# Patient Record
Sex: FEMALE | Race: BLACK OR AFRICAN AMERICAN | NOT HISPANIC OR LATINO | Employment: UNEMPLOYED | URBAN - METROPOLITAN AREA
[De-identification: names, ages, dates, MRNs, and addresses within clinical notes are randomized per-mention and may not be internally consistent; named-entity substitution may affect disease eponyms.]

---

## 2023-06-25 ENCOUNTER — HOSPITAL ENCOUNTER (INPATIENT)
Facility: HOSPITAL | Age: 73
LOS: 1 days | Discharge: HOME | DRG: 378 | End: 2023-06-29
Attending: EMERGENCY MEDICINE | Admitting: HOSPITALIST
Payer: MEDICARE

## 2023-06-25 DIAGNOSIS — N12 PYELONEPHRITIS: ICD-10-CM

## 2023-06-25 DIAGNOSIS — K92.2 ACUTE UPPER GI BLEED: ICD-10-CM

## 2023-06-25 DIAGNOSIS — D64.9 ANEMIA, UNSPECIFIED TYPE: Primary | ICD-10-CM

## 2023-06-25 DIAGNOSIS — R07.9 CHEST PAIN: ICD-10-CM

## 2023-06-25 DIAGNOSIS — K92.1 MELENA: ICD-10-CM

## 2023-06-25 PROBLEM — F32.A DEPRESSION: Status: ACTIVE | Noted: 2023-06-25

## 2023-06-25 PROBLEM — E11.9 DM (DIABETES MELLITUS): Status: ACTIVE | Noted: 2023-06-25

## 2023-06-25 PROBLEM — E03.9 HYPOTHYROIDISM, UNSPECIFIED: Status: ACTIVE | Noted: 2023-06-25

## 2023-06-25 PROBLEM — I10 HYPERTENSION: Status: ACTIVE | Noted: 2023-06-25

## 2023-06-25 PROBLEM — E78.5 HYPERLIPIDEMIA: Status: ACTIVE | Noted: 2023-06-25

## 2023-06-25 LAB
ALBUMIN SERPL BCP-MCNC: 3.6 G/DL (ref 3.5–5.2)
ALP SERPL-CCNC: 86 U/L (ref 55–135)
ALT SERPL W/O P-5'-P-CCNC: 17 U/L (ref 10–44)
ANION GAP SERPL CALC-SCNC: 13 MMOL/L (ref 8–16)
AST SERPL-CCNC: 26 U/L (ref 10–40)
BACTERIA #/AREA URNS HPF: ABNORMAL /HPF
BASOPHILS # BLD AUTO: 0.05 K/UL (ref 0–0.2)
BASOPHILS NFR BLD: 0.5 % (ref 0–1.9)
BILIRUB SERPL-MCNC: 0.6 MG/DL (ref 0.1–1)
BILIRUB UR QL STRIP: NEGATIVE
BUN SERPL-MCNC: 23 MG/DL (ref 8–23)
CALCIUM SERPL-MCNC: 9.1 MG/DL (ref 8.7–10.5)
CHLORIDE SERPL-SCNC: 105 MMOL/L (ref 95–110)
CLARITY UR: ABNORMAL
CO2 SERPL-SCNC: 18 MMOL/L (ref 23–29)
COLOR UR: YELLOW
CREAT SERPL-MCNC: 1.4 MG/DL (ref 0.5–1.4)
DIFFERENTIAL METHOD: ABNORMAL
EOSINOPHIL # BLD AUTO: 0.1 K/UL (ref 0–0.5)
EOSINOPHIL NFR BLD: 1.1 % (ref 0–8)
ERYTHROCYTE [DISTWIDTH] IN BLOOD BY AUTOMATED COUNT: 17.8 % (ref 11.5–14.5)
EST. GFR  (NO RACE VARIABLE): 40 ML/MIN/1.73 M^2
GLUCOSE SERPL-MCNC: 195 MG/DL (ref 70–110)
GLUCOSE UR QL STRIP: ABNORMAL
HCT VFR BLD AUTO: 29.2 % (ref 37–48.5)
HGB BLD-MCNC: 8.5 G/DL (ref 12–16)
HGB UR QL STRIP: ABNORMAL
HYALINE CASTS #/AREA URNS LPF: 0 /LPF
IMM GRANULOCYTES # BLD AUTO: 0.03 K/UL (ref 0–0.04)
IMM GRANULOCYTES NFR BLD AUTO: 0.3 % (ref 0–0.5)
KETONES UR QL STRIP: NEGATIVE
LEUKOCYTE ESTERASE UR QL STRIP: ABNORMAL
LIPASE SERPL-CCNC: 43 U/L (ref 4–60)
LYMPHOCYTES # BLD AUTO: 2.1 K/UL (ref 1–4.8)
LYMPHOCYTES NFR BLD: 21.3 % (ref 18–48)
MCH RBC QN AUTO: 22.5 PG (ref 27–31)
MCHC RBC AUTO-ENTMCNC: 29.1 G/DL (ref 32–36)
MCV RBC AUTO: 78 FL (ref 82–98)
MICROSCOPIC COMMENT: ABNORMAL
MONOCYTES # BLD AUTO: 0.9 K/UL (ref 0.3–1)
MONOCYTES NFR BLD: 9.4 % (ref 4–15)
NEUTROPHILS # BLD AUTO: 6.5 K/UL (ref 1.8–7.7)
NEUTROPHILS NFR BLD: 67.4 % (ref 38–73)
NITRITE UR QL STRIP: POSITIVE
NRBC BLD-RTO: 1 /100 WBC
PH UR STRIP: 6 [PH] (ref 5–8)
PLATELET # BLD AUTO: 415 K/UL (ref 150–450)
PMV BLD AUTO: 8.5 FL (ref 9.2–12.9)
POTASSIUM SERPL-SCNC: 3.8 MMOL/L (ref 3.5–5.1)
PROT SERPL-MCNC: 8 G/DL (ref 6–8.4)
PROT UR QL STRIP: ABNORMAL
RBC # BLD AUTO: 3.77 M/UL (ref 4–5.4)
RBC #/AREA URNS HPF: 9 /HPF (ref 0–4)
SODIUM SERPL-SCNC: 136 MMOL/L (ref 136–145)
SP GR UR STRIP: 1.02 (ref 1–1.03)
SQUAMOUS #/AREA URNS HPF: 2 /HPF
URN SPEC COLLECT METH UR: ABNORMAL
UROBILINOGEN UR STRIP-ACNC: NEGATIVE EU/DL
WBC # BLD AUTO: 9.64 K/UL (ref 3.9–12.7)
WBC #/AREA URNS HPF: >100 /HPF (ref 0–5)
WBC CLUMPS URNS QL MICRO: ABNORMAL
YEAST URNS QL MICRO: ABNORMAL

## 2023-06-25 PROCEDURE — 87088 URINE BACTERIA CULTURE: CPT | Performed by: PHYSICIAN ASSISTANT

## 2023-06-25 PROCEDURE — C9113 INJ PANTOPRAZOLE SODIUM, VIA: HCPCS | Performed by: EMERGENCY MEDICINE

## 2023-06-25 PROCEDURE — 99285 EMERGENCY DEPT VISIT HI MDM: CPT | Mod: 25

## 2023-06-25 PROCEDURE — 96365 THER/PROPH/DIAG IV INF INIT: CPT

## 2023-06-25 PROCEDURE — G0378 HOSPITAL OBSERVATION PER HR: HCPCS

## 2023-06-25 PROCEDURE — 63600175 PHARM REV CODE 636 W HCPCS: Performed by: EMERGENCY MEDICINE

## 2023-06-25 PROCEDURE — 87040 BLOOD CULTURE FOR BACTERIA: CPT | Performed by: NURSE PRACTITIONER

## 2023-06-25 PROCEDURE — 96375 TX/PRO/DX INJ NEW DRUG ADDON: CPT

## 2023-06-25 PROCEDURE — 80053 COMPREHEN METABOLIC PANEL: CPT | Performed by: PHYSICIAN ASSISTANT

## 2023-06-25 PROCEDURE — 81000 URINALYSIS NONAUTO W/SCOPE: CPT | Performed by: PHYSICIAN ASSISTANT

## 2023-06-25 PROCEDURE — 87086 URINE CULTURE/COLONY COUNT: CPT | Performed by: PHYSICIAN ASSISTANT

## 2023-06-25 PROCEDURE — 83690 ASSAY OF LIPASE: CPT | Performed by: PHYSICIAN ASSISTANT

## 2023-06-25 PROCEDURE — 85025 COMPLETE CBC W/AUTO DIFF WBC: CPT | Performed by: PHYSICIAN ASSISTANT

## 2023-06-25 PROCEDURE — 25000003 PHARM REV CODE 250: Performed by: EMERGENCY MEDICINE

## 2023-06-25 PROCEDURE — 87077 CULTURE AEROBIC IDENTIFY: CPT | Performed by: PHYSICIAN ASSISTANT

## 2023-06-25 PROCEDURE — 87186 SC STD MICRODIL/AGAR DIL: CPT | Performed by: PHYSICIAN ASSISTANT

## 2023-06-25 RX ORDER — GABAPENTIN 300 MG/1
300 CAPSULE ORAL DAILY
Status: DISCONTINUED | OUTPATIENT
Start: 2023-06-26 | End: 2023-06-29 | Stop reason: HOSPADM

## 2023-06-25 RX ORDER — SULFAMETHOXAZOLE AND TRIMETHOPRIM 800; 160 MG/1; MG/1
1 TABLET ORAL 2 TIMES DAILY
Status: ON HOLD | COMMUNITY
Start: 2023-05-05 | End: 2023-06-27 | Stop reason: HOSPADM

## 2023-06-25 RX ORDER — TALC
6 POWDER (GRAM) TOPICAL NIGHTLY PRN
Status: DISCONTINUED | OUTPATIENT
Start: 2023-06-25 | End: 2023-06-29 | Stop reason: HOSPADM

## 2023-06-25 RX ORDER — GLUCAGON 1 MG
1 KIT INJECTION
Status: DISCONTINUED | OUTPATIENT
Start: 2023-06-26 | End: 2023-06-29 | Stop reason: HOSPADM

## 2023-06-25 RX ORDER — LEVOTHYROXINE SODIUM 112 UG/1
112 TABLET ORAL
COMMUNITY
Start: 2023-03-14

## 2023-06-25 RX ORDER — GABAPENTIN 300 MG/1
CAPSULE ORAL
COMMUNITY
Start: 2023-06-03

## 2023-06-25 RX ORDER — LEVOTHYROXINE SODIUM 112 UG/1
112 TABLET ORAL
Status: DISCONTINUED | OUTPATIENT
Start: 2023-06-26 | End: 2023-06-29 | Stop reason: HOSPADM

## 2023-06-25 RX ORDER — EMPAGLIFLOZIN 25 MG/1
25 TABLET, FILM COATED ORAL
COMMUNITY
Start: 2023-06-02

## 2023-06-25 RX ORDER — ONDANSETRON 2 MG/ML
4 INJECTION INTRAMUSCULAR; INTRAVENOUS EVERY 8 HOURS PRN
Status: DISCONTINUED | OUTPATIENT
Start: 2023-06-25 | End: 2023-06-29 | Stop reason: HOSPADM

## 2023-06-25 RX ORDER — DEXTROSE 40 %
15 GEL (GRAM) ORAL
Status: DISCONTINUED | OUTPATIENT
Start: 2023-06-25 | End: 2023-06-29 | Stop reason: HOSPADM

## 2023-06-25 RX ORDER — LIRAGLUTIDE 6 MG/ML
INJECTION SUBCUTANEOUS
COMMUNITY
Start: 2023-05-31

## 2023-06-25 RX ORDER — GLUCAGON 1 MG
1 KIT INJECTION
Status: CANCELLED | OUTPATIENT
Start: 2023-06-25

## 2023-06-25 RX ORDER — DULOXETIN HYDROCHLORIDE 30 MG/1
30 CAPSULE, DELAYED RELEASE ORAL 2 TIMES DAILY
COMMUNITY
Start: 2023-06-02

## 2023-06-25 RX ORDER — MORPHINE SULFATE 2 MG/ML
2 INJECTION, SOLUTION INTRAMUSCULAR; INTRAVENOUS
Status: COMPLETED | OUTPATIENT
Start: 2023-06-25 | End: 2023-06-25

## 2023-06-25 RX ORDER — DULOXETIN HYDROCHLORIDE 30 MG/1
30 CAPSULE, DELAYED RELEASE ORAL 2 TIMES DAILY
Status: DISCONTINUED | OUTPATIENT
Start: 2023-06-26 | End: 2023-06-29 | Stop reason: HOSPADM

## 2023-06-25 RX ORDER — AMLODIPINE BESYLATE 5 MG/1
5 TABLET ORAL
COMMUNITY
Start: 2023-06-02

## 2023-06-25 RX ORDER — OMEPRAZOLE 20 MG/1
20 CAPSULE, DELAYED RELEASE ORAL 2 TIMES DAILY
Status: ON HOLD | COMMUNITY
Start: 2023-04-25 | End: 2023-06-27 | Stop reason: HOSPADM

## 2023-06-25 RX ORDER — FERROUS SULFATE 325(65) MG
325 TABLET ORAL 2 TIMES DAILY
COMMUNITY

## 2023-06-25 RX ORDER — KETOROLAC TROMETHAMINE 30 MG/ML
15 INJECTION, SOLUTION INTRAMUSCULAR; INTRAVENOUS
Status: COMPLETED | OUTPATIENT
Start: 2023-06-25 | End: 2023-06-25

## 2023-06-25 RX ORDER — GLUCAGON 1 MG
1 KIT INJECTION
Status: DISCONTINUED | OUTPATIENT
Start: 2023-06-25 | End: 2023-06-29 | Stop reason: HOSPADM

## 2023-06-25 RX ORDER — DEXTROSE 40 %
30 GEL (GRAM) ORAL
Status: DISCONTINUED | OUTPATIENT
Start: 2023-06-25 | End: 2023-06-29 | Stop reason: HOSPADM

## 2023-06-25 RX ORDER — KETOROLAC TROMETHAMINE 30 MG/ML
15 INJECTION, SOLUTION INTRAMUSCULAR; INTRAVENOUS EVERY 6 HOURS PRN
Status: DISCONTINUED | OUTPATIENT
Start: 2023-06-26 | End: 2023-06-26

## 2023-06-25 RX ORDER — METFORMIN HYDROCHLORIDE 500 MG/1
500 TABLET ORAL 2 TIMES DAILY
COMMUNITY
Start: 2023-01-04

## 2023-06-25 RX ORDER — SODIUM CHLORIDE 0.9 % (FLUSH) 0.9 %
3 SYRINGE (ML) INJECTION EVERY 12 HOURS PRN
Status: DISCONTINUED | OUTPATIENT
Start: 2023-06-25 | End: 2023-06-29 | Stop reason: HOSPADM

## 2023-06-25 RX ORDER — INSULIN ASPART 100 [IU]/ML
1-10 INJECTION, SOLUTION INTRAVENOUS; SUBCUTANEOUS EVERY 6 HOURS PRN
Status: DISCONTINUED | OUTPATIENT
Start: 2023-06-26 | End: 2023-06-29 | Stop reason: HOSPADM

## 2023-06-25 RX ORDER — ALBUTEROL SULFATE 90 UG/1
AEROSOL, METERED RESPIRATORY (INHALATION)
COMMUNITY
Start: 2023-06-02

## 2023-06-25 RX ORDER — CARVEDILOL 3.12 MG/1
3.12 TABLET ORAL 2 TIMES DAILY
Status: DISCONTINUED | OUTPATIENT
Start: 2023-06-26 | End: 2023-06-26

## 2023-06-25 RX ORDER — PANTOPRAZOLE SODIUM 40 MG/10ML
80 INJECTION, POWDER, LYOPHILIZED, FOR SOLUTION INTRAVENOUS ONCE
Status: COMPLETED | OUTPATIENT
Start: 2023-06-25 | End: 2023-06-25

## 2023-06-25 RX ORDER — LOSARTAN POTASSIUM 50 MG/1
50 TABLET ORAL
COMMUNITY
Start: 2023-02-14

## 2023-06-25 RX ORDER — CARVEDILOL 3.12 MG/1
3.12 TABLET ORAL 2 TIMES DAILY
COMMUNITY
Start: 2023-04-25

## 2023-06-25 RX ORDER — APIXABAN 5 MG/1
5 TABLET, FILM COATED ORAL 2 TIMES DAILY
Status: ON HOLD | COMMUNITY
Start: 2023-06-02 | End: 2023-06-27 | Stop reason: SDUPTHER

## 2023-06-25 RX ORDER — LANOLIN ALCOHOL/MO/W.PET/CERES
1 CREAM (GRAM) TOPICAL 2 TIMES DAILY
Status: DISCONTINUED | OUTPATIENT
Start: 2023-06-26 | End: 2023-06-29 | Stop reason: HOSPADM

## 2023-06-25 RX ORDER — BUDESONIDE AND FORMOTEROL FUMARATE DIHYDRATE 160; 4.5 UG/1; UG/1
AEROSOL RESPIRATORY (INHALATION)
COMMUNITY
Start: 2023-06-02

## 2023-06-25 RX ORDER — NALOXONE HCL 0.4 MG/ML
0.02 VIAL (ML) INJECTION
Status: DISCONTINUED | OUTPATIENT
Start: 2023-06-25 | End: 2023-06-29 | Stop reason: HOSPADM

## 2023-06-25 RX ORDER — ATORVASTATIN CALCIUM 40 MG/1
40 TABLET, FILM COATED ORAL
COMMUNITY
Start: 2023-06-02

## 2023-06-25 RX ORDER — MORPHINE SULFATE 4 MG/ML
4 INJECTION, SOLUTION INTRAMUSCULAR; INTRAVENOUS EVERY 6 HOURS PRN
Status: DISCONTINUED | OUTPATIENT
Start: 2023-06-26 | End: 2023-06-26

## 2023-06-25 RX ORDER — PANTOPRAZOLE SODIUM 40 MG/10ML
40 INJECTION, POWDER, LYOPHILIZED, FOR SOLUTION INTRAVENOUS 2 TIMES DAILY
Status: DISCONTINUED | OUTPATIENT
Start: 2023-06-26 | End: 2023-06-27

## 2023-06-25 RX ORDER — ONDANSETRON 2 MG/ML
4 INJECTION INTRAMUSCULAR; INTRAVENOUS
Status: COMPLETED | OUTPATIENT
Start: 2023-06-25 | End: 2023-06-25

## 2023-06-25 RX ORDER — ATORVASTATIN CALCIUM 40 MG/1
40 TABLET, FILM COATED ORAL DAILY
Status: DISCONTINUED | OUTPATIENT
Start: 2023-06-26 | End: 2023-06-29 | Stop reason: HOSPADM

## 2023-06-25 RX ADMIN — CEFTRIAXONE 1 G: 1 INJECTION, POWDER, FOR SOLUTION INTRAMUSCULAR; INTRAVENOUS at 07:06

## 2023-06-25 RX ADMIN — ONDANSETRON 4 MG: 2 INJECTION INTRAMUSCULAR; INTRAVENOUS at 08:06

## 2023-06-25 RX ADMIN — PANTOPRAZOLE SODIUM 80 MG: 40 INJECTION, POWDER, LYOPHILIZED, FOR SOLUTION INTRAVENOUS at 09:06

## 2023-06-25 RX ADMIN — MORPHINE SULFATE 2 MG: 2 INJECTION, SOLUTION INTRAMUSCULAR; INTRAVENOUS at 08:06

## 2023-06-25 RX ADMIN — KETOROLAC TROMETHAMINE 15 MG: 30 INJECTION, SOLUTION INTRAMUSCULAR; INTRAVENOUS at 07:06

## 2023-06-25 NOTE — ED NOTES
Pt presents to ED for left side pain. Pt from [place], missed flight.    Review of patient's allergies indicates:  No Known Allergies    APPEARANCE: Alert, oriented x 4, and in no acute distress.  NEURO: 5/5 strength major flexors/extensors bilaterally. Sensory intact to light touch bilaterally. Sandy coma scale: eyes open spontaneously-4, oriented & converses-5, obeys commands-6. No neurological abnormalities. Denies HA, dizziness, photophobia.  CARDIAC: Normal rate and rhythm through apical/radial pulse, S1 and S2 noted, denies CP.   RESPIRATORY: +tachypneic, expiratory wheezing, hx of COPD and asthma; No SOB reported.  PERIPHERAL VASCULAR: +2 peripheral pulses present. Normal cap refill <3sec. No edema. Warm to touch.   GASTRO: Abdomen soft, flat, bowel sounds normal and active in all 4 quadrants, no tenderness, no abdominal distention. Denies NVD.  MUSC: +left side pain, Full ROM. No bony tenderness or soft tissue tenderness. No obvious deformity.  SKIN: Skin is warm and dry, normal skin turgor, mucous membranes moist.   GENITOURINARY: Voids spontaneously, denies itching, burning, hematuria.    Patient changed into hospital gown with assistance. Side rails x 2, bed low and locked position, call light in reach and instructed how to use. Pt educated on fall risk and verbalized understanding. Cardiac monitor, pulse ox, and automatic BP cuff applied; alarms set and audible.

## 2023-06-25 NOTE — FIRST PROVIDER EVALUATION
Emergency Department TeleTriage Encounter Note      CHIEF COMPLAINT    Chief Complaint   Patient presents with    Abdominal Pain     LUQ, abd pain that started yesterday; denies n/v and diarrhea. LBM yesterday, and stool was dark. HX of  DM, HTN, COPD, Hyperthyroidism, hyperlipidemia, bronchitis, and anemia. No other symptoms at this time.  7/10 pain.        VITAL SIGNS   Initial Vitals [06/25/23 1702]   BP Pulse Resp Temp SpO2   133/62 107 20 98.7 °F (37.1 °C) 98 %      MAP       --            ALLERGIES    Review of patient's allergies indicates:  No Known Allergies    PROVIDER TRIAGE NOTE  Patient presents with LUQ pain and dark stool for 2 days. No N/V/D. Reports pain worse with coughing. Has only been coughing occasionally.       ORDERS  Labs Reviewed - No data to display    ED Orders (720h ago, onward)      None              Virtual Visit Note: The provider triage portion of this emergency department evaluation and documentation was performed via [employer], a HIPAA-compliant telemedicine application, in concert with a tele-presenter in the room. A face to face patient evaluation with one of my colleagues will occur once the patient is placed in an emergency department room.      DISCLAIMER: This note was prepared with [employer] voice recognition transcription software. Garbled syntax, mangled pronouns, and other bizarre constructions may be attributed to that software system.

## 2023-06-25 NOTE — Clinical Note
Diagnosis: Pyelonephritis [828802]   Future Attending Provider: DALI DESIR [[id number]]   Admitting Provider:: DALI DESIR [[id number]]   Special Needs:: No Special Needs [1]

## 2023-06-26 ENCOUNTER — ANESTHESIA (OUTPATIENT)
Dept: ENDOSCOPY | Facility: HOSPITAL | Age: 73
DRG: 378 | End: 2023-06-26
Payer: MEDICARE

## 2023-06-26 ENCOUNTER — ANESTHESIA EVENT (OUTPATIENT)
Dept: ENDOSCOPY | Facility: HOSPITAL | Age: 73
DRG: 378 | End: 2023-06-26
Payer: MEDICARE

## 2023-06-26 LAB
ANION GAP SERPL CALC-SCNC: 12 MMOL/L (ref 8–16)
BASOPHILS # BLD AUTO: 0.04 K/UL (ref 0–0.2)
BASOPHILS NFR BLD: 0.4 % (ref 0–1.9)
BUN SERPL-MCNC: 26 MG/DL (ref 8–23)
CALCIUM SERPL-MCNC: 9 MG/DL (ref 8.7–10.5)
CHLORIDE SERPL-SCNC: 106 MMOL/L (ref 95–110)
CO2 SERPL-SCNC: 20 MMOL/L (ref 23–29)
CREAT SERPL-MCNC: 1.5 MG/DL (ref 0.5–1.4)
DIFFERENTIAL METHOD: ABNORMAL
EOSINOPHIL # BLD AUTO: 0.1 K/UL (ref 0–0.5)
EOSINOPHIL NFR BLD: 1.4 % (ref 0–8)
ERYTHROCYTE [DISTWIDTH] IN BLOOD BY AUTOMATED COUNT: 17.6 % (ref 11.5–14.5)
EST. GFR  (NO RACE VARIABLE): 37 ML/MIN/1.73 M^2
GLUCOSE SERPL-MCNC: 131 MG/DL (ref 70–110)
HCT VFR BLD AUTO: 27.7 % (ref 37–48.5)
HGB BLD-MCNC: 8.2 G/DL (ref 12–16)
IMM GRANULOCYTES # BLD AUTO: 0.03 K/UL (ref 0–0.04)
IMM GRANULOCYTES NFR BLD AUTO: 0.3 % (ref 0–0.5)
LYMPHOCYTES # BLD AUTO: 2.9 K/UL (ref 1–4.8)
LYMPHOCYTES NFR BLD: 31.3 % (ref 18–48)
MCH RBC QN AUTO: 22.8 PG (ref 27–31)
MCHC RBC AUTO-ENTMCNC: 29.6 G/DL (ref 32–36)
MCV RBC AUTO: 77 FL (ref 82–98)
MONOCYTES # BLD AUTO: 0.8 K/UL (ref 0.3–1)
MONOCYTES NFR BLD: 9.1 % (ref 4–15)
NEUTROPHILS # BLD AUTO: 5.3 K/UL (ref 1.8–7.7)
NEUTROPHILS NFR BLD: 57.5 % (ref 38–73)
NRBC BLD-RTO: 1 /100 WBC
PLATELET # BLD AUTO: 377 K/UL (ref 150–450)
PMV BLD AUTO: 8.5 FL (ref 9.2–12.9)
POCT GLUCOSE: 125 MG/DL (ref 70–110)
POCT GLUCOSE: 145 MG/DL (ref 70–110)
POCT GLUCOSE: 169 MG/DL (ref 70–110)
POTASSIUM SERPL-SCNC: 4 MMOL/L (ref 3.5–5.1)
RBC # BLD AUTO: 3.59 M/UL (ref 4–5.4)
SODIUM SERPL-SCNC: 138 MMOL/L (ref 136–145)
WBC # BLD AUTO: 9.19 K/UL (ref 3.9–12.7)

## 2023-06-26 PROCEDURE — 25500020 PHARM REV CODE 255: Performed by: STUDENT IN AN ORGANIZED HEALTH CARE EDUCATION/TRAINING PROGRAM

## 2023-06-26 PROCEDURE — 80048 BASIC METABOLIC PNL TOTAL CA: CPT | Performed by: NURSE PRACTITIONER

## 2023-06-26 PROCEDURE — D9220A PRA ANESTHESIA: ICD-10-PCS | Mod: ANES,,, | Performed by: ANESTHESIOLOGY

## 2023-06-26 PROCEDURE — G0378 HOSPITAL OBSERVATION PER HR: HCPCS

## 2023-06-26 PROCEDURE — 25000003 PHARM REV CODE 250: Performed by: NURSE ANESTHETIST, CERTIFIED REGISTERED

## 2023-06-26 PROCEDURE — 99205 PR OFFICE/OUTPT VISIT, NEW, LEVL V, 60-74 MIN: ICD-10-PCS | Mod: 25,,, | Performed by: INTERNAL MEDICINE

## 2023-06-26 PROCEDURE — 37000008 HC ANESTHESIA 1ST 15 MINUTES: Performed by: INTERNAL MEDICINE

## 2023-06-26 PROCEDURE — 96372 THER/PROPH/DIAG INJ SC/IM: CPT | Mod: 59 | Performed by: NURSE PRACTITIONER

## 2023-06-26 PROCEDURE — 85025 COMPLETE CBC W/AUTO DIFF WBC: CPT | Performed by: NURSE PRACTITIONER

## 2023-06-26 PROCEDURE — 99900035 HC TECH TIME PER 15 MIN (STAT)

## 2023-06-26 PROCEDURE — 94761 N-INVAS EAR/PLS OXIMETRY MLT: CPT

## 2023-06-26 PROCEDURE — 94640 AIRWAY INHALATION TREATMENT: CPT

## 2023-06-26 PROCEDURE — 63600175 PHARM REV CODE 636 W HCPCS: Performed by: NURSE ANESTHETIST, CERTIFIED REGISTERED

## 2023-06-26 PROCEDURE — 43255 EGD CONTROL BLEEDING ANY: CPT | Mod: ,,, | Performed by: INTERNAL MEDICINE

## 2023-06-26 PROCEDURE — C9113 INJ PANTOPRAZOLE SODIUM, VIA: HCPCS | Performed by: EMERGENCY MEDICINE

## 2023-06-26 PROCEDURE — 36415 COLL VENOUS BLD VENIPUNCTURE: CPT | Performed by: NURSE PRACTITIONER

## 2023-06-26 PROCEDURE — 43255 PR EGD, FLEX, W/CTRL BLEED, ANY METHOD: ICD-10-PCS | Mod: ,,, | Performed by: INTERNAL MEDICINE

## 2023-06-26 PROCEDURE — 25000003 PHARM REV CODE 250: Performed by: NURSE PRACTITIONER

## 2023-06-26 PROCEDURE — D9220A PRA ANESTHESIA: Mod: ANES,,, | Performed by: ANESTHESIOLOGY

## 2023-06-26 PROCEDURE — D9220A PRA ANESTHESIA: Mod: CRNA,,, | Performed by: NURSE ANESTHETIST, CERTIFIED REGISTERED

## 2023-06-26 PROCEDURE — 63600175 PHARM REV CODE 636 W HCPCS: Performed by: EMERGENCY MEDICINE

## 2023-06-26 PROCEDURE — 25000242 PHARM REV CODE 250 ALT 637 W/ HCPCS: Performed by: NURSE PRACTITIONER

## 2023-06-26 PROCEDURE — 63600175 PHARM REV CODE 636 W HCPCS: Performed by: NURSE PRACTITIONER

## 2023-06-26 PROCEDURE — 27202087 HC PROBE, APC: Performed by: INTERNAL MEDICINE

## 2023-06-26 PROCEDURE — D9220A PRA ANESTHESIA: ICD-10-PCS | Mod: CRNA,,, | Performed by: NURSE ANESTHETIST, CERTIFIED REGISTERED

## 2023-06-26 PROCEDURE — 43255 EGD CONTROL BLEEDING ANY: CPT | Performed by: INTERNAL MEDICINE

## 2023-06-26 PROCEDURE — 37000009 HC ANESTHESIA EA ADD 15 MINS: Performed by: INTERNAL MEDICINE

## 2023-06-26 PROCEDURE — 99205 OFFICE O/P NEW HI 60 MIN: CPT | Mod: 25,,, | Performed by: INTERNAL MEDICINE

## 2023-06-26 RX ORDER — ALBUTEROL SULFATE 90 UG/1
2 AEROSOL, METERED RESPIRATORY (INHALATION) EVERY 6 HOURS PRN
Status: DISCONTINUED | OUTPATIENT
Start: 2023-06-26 | End: 2023-06-29 | Stop reason: HOSPADM

## 2023-06-26 RX ORDER — LIDOCAINE HYDROCHLORIDE 20 MG/ML
INJECTION INTRAVENOUS
Status: DISCONTINUED | OUTPATIENT
Start: 2023-06-26 | End: 2023-06-26

## 2023-06-26 RX ORDER — SODIUM CHLORIDE 9 MG/ML
INJECTION, SOLUTION INTRAVENOUS CONTINUOUS
Status: DISCONTINUED | OUTPATIENT
Start: 2023-06-26 | End: 2023-06-28

## 2023-06-26 RX ORDER — PROPOFOL 10 MG/ML
VIAL (ML) INTRAVENOUS
Status: DISCONTINUED | OUTPATIENT
Start: 2023-06-26 | End: 2023-06-26

## 2023-06-26 RX ORDER — FLUTICASONE FUROATE AND VILANTEROL 100; 25 UG/1; UG/1
1 POWDER RESPIRATORY (INHALATION) DAILY
Status: DISCONTINUED | OUTPATIENT
Start: 2023-06-26 | End: 2023-06-29 | Stop reason: HOSPADM

## 2023-06-26 RX ORDER — PROPOFOL 10 MG/ML
VIAL (ML) INTRAVENOUS CONTINUOUS PRN
Status: DISCONTINUED | OUTPATIENT
Start: 2023-06-26 | End: 2023-06-26

## 2023-06-26 RX ORDER — POLYETHYLENE GLYCOL 3350 17 G/17G
17 POWDER, FOR SOLUTION ORAL DAILY
Status: DISCONTINUED | OUTPATIENT
Start: 2023-06-26 | End: 2023-06-29 | Stop reason: HOSPADM

## 2023-06-26 RX ORDER — HYDRALAZINE HYDROCHLORIDE 20 MG/ML
10 INJECTION INTRAMUSCULAR; INTRAVENOUS EVERY 8 HOURS PRN
Status: DISCONTINUED | OUTPATIENT
Start: 2023-06-26 | End: 2023-06-29 | Stop reason: HOSPADM

## 2023-06-26 RX ORDER — HYDROCODONE BITARTRATE AND ACETAMINOPHEN 10; 325 MG/1; MG/1
1 TABLET ORAL EVERY 6 HOURS PRN
Status: DISCONTINUED | OUTPATIENT
Start: 2023-06-26 | End: 2023-06-29 | Stop reason: HOSPADM

## 2023-06-26 RX ORDER — HYDROCODONE BITARTRATE AND ACETAMINOPHEN 5; 325 MG/1; MG/1
1 TABLET ORAL EVERY 6 HOURS PRN
Status: DISCONTINUED | OUTPATIENT
Start: 2023-06-26 | End: 2023-06-29 | Stop reason: HOSPADM

## 2023-06-26 RX ADMIN — SODIUM CHLORIDE: 9 INJECTION, SOLUTION INTRAVENOUS at 05:06

## 2023-06-26 RX ADMIN — ATORVASTATIN CALCIUM 40 MG: 40 TABLET, FILM COATED ORAL at 09:06

## 2023-06-26 RX ADMIN — LIDOCAINE HYDROCHLORIDE 100 MG: 20 INJECTION, SOLUTION INTRAVENOUS at 03:06

## 2023-06-26 RX ADMIN — DULOXETINE 30 MG: 30 CAPSULE, DELAYED RELEASE ORAL at 09:06

## 2023-06-26 RX ADMIN — MORPHINE SULFATE 4 MG: 4 INJECTION INTRAVENOUS at 06:06

## 2023-06-26 RX ADMIN — FERROUS SULFATE TAB 325 MG (65 MG ELEMENTAL FE) 1 EACH: 325 (65 FE) TAB at 08:06

## 2023-06-26 RX ADMIN — SODIUM CHLORIDE: 0.9 INJECTION, SOLUTION INTRAVENOUS at 03:06

## 2023-06-26 RX ADMIN — FLUTICASONE FUROATE AND VILANTEROL TRIFENATATE 1 PUFF: 100; 25 POWDER RESPIRATORY (INHALATION) at 11:06

## 2023-06-26 RX ADMIN — HYDROCODONE BITARTRATE AND ACETAMINOPHEN 1 TABLET: 10; 325 TABLET ORAL at 08:06

## 2023-06-26 RX ADMIN — INSULIN ASPART 2 UNITS: 100 INJECTION, SOLUTION INTRAVENOUS; SUBCUTANEOUS at 12:06

## 2023-06-26 RX ADMIN — SODIUM CHLORIDE: 9 INJECTION, SOLUTION INTRAVENOUS at 12:06

## 2023-06-26 RX ADMIN — HYDRALAZINE HYDROCHLORIDE 10 MG: 20 INJECTION, SOLUTION INTRAMUSCULAR; INTRAVENOUS at 08:06

## 2023-06-26 RX ADMIN — CEFTRIAXONE 1 G: 1 INJECTION, POWDER, FOR SOLUTION INTRAMUSCULAR; INTRAVENOUS at 06:06

## 2023-06-26 RX ADMIN — MORPHINE SULFATE 4 MG: 4 INJECTION INTRAVENOUS at 12:06

## 2023-06-26 RX ADMIN — LEVOTHYROXINE SODIUM 112 MCG: 112 TABLET ORAL at 06:06

## 2023-06-26 RX ADMIN — FERROUS SULFATE TAB 325 MG (65 MG ELEMENTAL FE) 1 EACH: 325 (65 FE) TAB at 09:06

## 2023-06-26 RX ADMIN — PANTOPRAZOLE SODIUM 40 MG: 40 INJECTION, POWDER, LYOPHILIZED, FOR SOLUTION INTRAVENOUS at 09:06

## 2023-06-26 RX ADMIN — GABAPENTIN 300 MG: 300 CAPSULE ORAL at 09:06

## 2023-06-26 RX ADMIN — PROPOFOL 100 MCG/KG/MIN: 10 INJECTION, EMULSION INTRAVENOUS at 03:06

## 2023-06-26 RX ADMIN — DULOXETINE 30 MG: 30 CAPSULE, DELAYED RELEASE ORAL at 08:06

## 2023-06-26 RX ADMIN — PANTOPRAZOLE SODIUM 40 MG: 40 INJECTION, POWDER, LYOPHILIZED, FOR SOLUTION INTRAVENOUS at 08:06

## 2023-06-26 RX ADMIN — PROPOFOL 70 MG: 10 INJECTION, EMULSION INTRAVENOUS at 03:06

## 2023-06-26 RX ADMIN — IOHEXOL 100 ML: 350 INJECTION, SOLUTION INTRAVENOUS at 06:06

## 2023-06-26 RX ADMIN — POLYETHYLENE GLYCOL 3350 17 G: 17 POWDER, FOR SOLUTION ORAL at 08:06

## 2023-06-26 NOTE — TRANSFER OF CARE
"Anesthesia Transfer of Care Note    Patient: Alex Lovell    Procedure(s) Performed: Procedure(s) (LRB):  EGD (ESOPHAGOGASTRODUODENOSCOPY) (N/A)    Patient location: GI    Anesthesia Type: general    Transport from OR: Transported from OR on room air with adequate spontaneous ventilation    Post pain: adequate analgesia    Post assessment: no apparent anesthetic complications    Post vital signs: stable    Level of consciousness: awake, alert and oriented    Nausea/Vomiting: no nausea/vomiting    Complications: none    Transfer of care protocol was followed      Last vitals:   Visit Vitals  /86 (BP Location: Left arm, Patient Position: Sitting)   Pulse 95   Temp 36.5 °C (97.7 °F) (Temporal)   Resp 19   Ht 5' 2" (1.575 m)   Wt 97.4 kg (214 lb 11.7 oz)   SpO2 98%   BMI 39.27 kg/m²     "

## 2023-06-26 NOTE — PLAN OF CARE
Report given to Tona asif. 1 angioectasia treated with APC. Pt complaining of LUQ pain 7.5/10. Nurse and MD notified. Xray ordered for when pt returns to room. VSS /77, RR 18, sats 99%, HR 90. Nurse states pt c/o LUQ pain prior to hospital arrival. Transport placed for pt to return to room.

## 2023-06-26 NOTE — ASSESSMENT & PLAN NOTE
Patient's FSGs are controlled on current medication regimen.  Last A1c reviewed- No results found for: LABA1C, HGBA1C  Most recent glucose reviewed- 195  Current correctional scale  Medium  Maintain anti-hyperglycemic dose as follows-   Antihyperglycemics (From admission, onward)    Start     Stop Route Frequency Ordered    06/26/23 0027  insulin aspart U-100 pen 1-10 Units         -- SubQ Every 6 hours PRN 06/25/23 8127        Hold Oral hypoglycemics while patient is in the hospital.

## 2023-06-26 NOTE — ANESTHESIA PREPROCEDURE EVALUATION
[facility]  Anesthesia Pre-Operative Evaluation         Patient Name: Alex Lovell  YOB: 1950  MRN: [id number]    SUBJECTIVE:     06/26/2023    Procedure(s) (LRB):  EGD (ESOPHAGOGASTRODUODENOSCOPY) (N/A)    Alex Lovell is a 72 y.o. female here for Procedure(s) (LRB):  EGD (ESOPHAGOGASTRODUODENOSCOPY) (N/A)    Drips:    sodium chloride 0.9% 75 mL/hr at 06/26/23 1230       Patient Active Problem List   Diagnosis    Pyelonephritis    Anemia    Melena    DM (diabetes mellitus)    Depression    Hypertension    Hyperlipidemia    Hypothyroidism, unspecified       Review of patient's allergies indicates:  No Known Allergies    No current facility-administered medications on file prior to encounter.     Current Outpatient Medications on File Prior to Encounter   Medication Sig Dispense Refill    amLODIPine (NORVASC) 5 MG tablet Take 5 mg by mouth.      atorvastatin (LIPITOR) 40 MG tablet Take 40 mg by mouth.      carvediloL (COREG) 3.125 MG tablet Take 3.125 mg by mouth 2 (two) times daily.      DULoxetine (CYMBALTA) 30 MG capsule Take 30 mg by mouth 2 (two) times daily.      ELIQUIS 5 mg Tab Take 5 mg by mouth 2 (two) times daily.      ferrous sulfate (FEOSOL) 325 mg (65 mg iron) Tab tablet Take 325 mg by mouth 2 (two) times daily.      gabapentin (NEURONTIN) 300 MG capsule Take by mouth.      JARDIANCE 25 mg tablet Take 25 mg by mouth.      levothyroxine (SYNTHROID) 112 MCG tablet Take 112 mcg by mouth.      losartan (COZAAR) 50 MG tablet Take 50 mg by mouth.      metFORMIN (GLUCOPHAGE) 500 MG tablet Take 500 mg by mouth 2 (two) times daily.      omeprazole (PRILOSEC) 20 MG capsule Take 20 mg by mouth 2 (two) times daily.      sulfamethoxazole-trimethoprim 800-160mg (BACTRIM DS) 800-160 mg Tab Take 1 tablet by mouth 2 (two) times daily.      SYMBICORT 160-4.5  mcg/actuation HFAA Inhale into the lungs.      VENTOLIN HFA 90 mcg/actuation inhaler Inhale into the lungs.      VICTOZA 3-ANTONIO 0.6 mg/0.1 mL (18 mg/3 mL) PnIj pen Inject into the skin.         Past Surgical History:   Procedure Laterality Date    RESECTION, LUNG Left     LLL wedge resection       Social History     Socioeconomic History    Marital status: Single   Tobacco Use    Smoking status: Never    Smokeless tobacco: Never   Substance and Sexual Activity    Alcohol use: Not Currently    Drug use: Never    Sexual activity: Not Currently         OBJECTIVE:     Vital Signs Range (Last 24H):  Temp:  [35.8 °C (96.4 °F)-37.1 °C (98.7 °F)] 36.5 °C (97.7 °F)  Pulse:  [] 95  Resp:  [16-22] 19  SpO2:  [96 %-100 %] 98 %  BP: (125-181)/() 127/86    Significant Labs:  Lab Results   Component Value Date    WBC 9.19 06/26/2023    HGB 8.2 (L) 06/26/2023    HCT 27.7 (L) 06/26/2023     06/26/2023    ALT 17 06/25/2023    AST 26 06/25/2023     06/26/2023    K 4.0 06/26/2023     06/26/2023    CREATININE 1.5 (H) 06/26/2023    BUN 26 (H) 06/26/2023    CO2 20 (L) 06/26/2023         Pre-op Assessment    I have reviewed the Patient Summary Reports.     I have reviewed the Nursing Notes. I have reviewed the NPO Status.   I have reviewed the Medications.     Review of Systems  Anesthesia Hx:  No problems with previous Anesthesia  History of prior surgery of interest to airway management or planning:  Denies Personal Hx of Anesthesia complications.   Hematology/Oncology:         -- Cancer in past history: Other (see Oncology comments) left surgery  Oncology Comments: Lung cancer s/p wedge resection      Cardiovascular:   Hypertension Denies MI.      Pulmonary:   COPD Shortness of breath Sleep Apnea    Renal/:   Denies Chronic Renal Disease.     Hepatic/GI:   Hiatal Hernia, GERD Denies Liver Disease.    Neurological:   Denies TIA. Denies CVA. Denies Seizures.    Endocrine:   Diabetes Hypothyroidism  BMI 39.3  Obesity / BMI > 30  Psych:   depression          Physical Exam  General: Well nourished, Cooperative, Alert and Oriented    Airway:  Mallampati: III   Mouth Opening: Normal  TM Distance: Normal      Dental:  Edentulous    Chest/Lungs:  Tachypnea        Anesthesia Plan  Type of Anesthesia, risks & benefits discussed:    Anesthesia Type: Gen Natural Airway  Intra-op Monitoring Plan: Standard ASA Monitors  Post Op Pain Control Plan: multimodal analgesia and IV/PO Opioids PRN  Induction:  IV  Informed Consent: Informed consent signed with the Patient and all parties understand the risks and agree with anesthesia plan.  All questions answered.   ASA Score: 3  Day of Surgery Review of History & Physical: H&P Update referred to the surgeon/provider.    Ready For Surgery From Anesthesia Perspective.     .

## 2023-06-26 NOTE — ED NOTES
RN first encounter with pt, assumed care from ZENA Palmer. Pt sitting up in stretcher. Pt reports abdominal pain, pt has UTI, pt updated on results. Pain mediation ordered, RN will administer. Pt is hypertensive, pt reports hx of htn, states she was able to take her medications this morning. Pt also reports pain with inspiration. NAD noted. REU. Bed in lowest locked position, call bell within reach, side rails up x 2.

## 2023-06-26 NOTE — HPI
This is 73 y/o lady hx DM, htn, lung cancer, apparent PUD in past here for bilateral flank pain.  Gi consulted for melena.  Here in town for family reunion. Developed black looking stool. No vomiting. No raditaing symptoms. Does have flank pain and dysuria as well. + fatigue as well. No alleviating factors. Generalized malaise.     Noted hgb lower than baseline.   She reports hx of PUD in past, maybe 5 years ago    Also had colonoscopy in past, she cant recall exactly when, but reports it was fairly unremarkable.

## 2023-06-26 NOTE — ASSESSMENT & PLAN NOTE
· Associated with b/l flank pain L>R, malodorous urine, and urgency  · UA positive for 3+ leukocytes, many bacteria, >100 WBC, and many WBC clumps  · Continue rocephin  · Urine cx in process  · Blood cx in process  · PRN analgesics for flank pain    6/26  - blood culture 2/2 ngtd  - urine culture pending  - continue ceftriaxone

## 2023-06-26 NOTE — [employer]ATION PATIENT INSTRUCTIONS
Discharge Summary/Instructions after an Endoscopic Procedure  Patient Name: Alex Lovell  Patient MRN: [id number]  Patient YOB: 1950 Monday, June 26, 2023  Jose Mckeon MD  Dear patient,  As a result of recent federal legislation (The Federal Cures Act), you may   receive lab or pathology results from your procedure in your [facility]   account before your physician is able to contact you. Your physician or   their representative will relay the results to you with their   recommendations at their soonest availability.  Thank you,  Your health is very important to us during the Covid Crisis. Following your   procedure today, you will receive a daily text for 2 weeks asking about   signs or symptoms of Covid 19.  Please respond to this text when you   receive it so we can follow up and keep you as safe as possible.   RESTRICTIONS:  During your procedure today, you received medications for sedation.  These   medications may affect your judgment, balance and coordination.  Therefore,   for 24 hours, you have the following restrictions:   - DO NOT drive a car, operate machinery, make legal/financial decisions,   sign important papers or drink alcohol.    ACTIVITY:  Today: no heavy lifting, straining or running due to procedural   sedation/anesthesia.  The following day: return to full activity including work.  DIET:  Eat and drink normally unless instructed otherwise.     TREATMENT FOR COMMON SIDE EFFECTS:  - Mild abdominal pain, nausea, belching, bloating or excessive gas:  rest,   eat lightly and use a heating pad.  - Sore Throat: treat with throat lozenges and/or gargle with warm salt   water.  - Because air was used during the procedure, expelling large amounts of air   from your rectum or belching is normal.  - If a bowel prep was taken, you may not have a bowel movement for 1-3 days.    This is normal.  SYMPTOMS TO WATCH FOR AND REPORT TO YOUR PHYSICIAN:  1. Abdominal pain or bloating, other than  gas cramps.  2. Chest pain.  3. Back pain.  4. Signs of infection such as: chills or fever occurring within 24 hours   after the procedure.  5. Rectal bleeding, which would show as bright red, maroon, or black stools.   (A tablespoon of blood from the rectum is not serious, especially if   hemorrhoids are present.)  6. Vomiting.  7. Weakness or dizziness.  GO DIRECTLY TO THE NEAREST EMERGENCY ROOM IF YOU HAVE ANY OF THE FOLLOWING:      Difficulty breathing              Chills and/or fever over 101 F   Persistent vomiting and/or vomiting blood   Severe abdominal pain   Severe chest pain   Black, tarry stools   Bleeding- more than one tablespoon   Any other symptom or condition that you feel may need urgent attention  Your doctor recommends these additional instructions:  If any biopsies were taken, your doctors clinic will contact you in 1 to 2   weeks with any results.  - [facility] patient to [facility] for [facility] care.   - Advance diet as tolerated.   - Continue present medications.   - protonix twice a day for 2 months. Would hold eliquis for another 3 days,   then ok to resume  - Repeat upper endoscopy in 2 months assess rest of stomach, would give   liquids for 24 hour prior. This can be done back home  For questions, problems or results please call your physician - Jose Mckeon MD.  EMERGENCY PHONE NUMBER: [phone],  LAB RESULTS: [phone]  IF A COMPLICATION OR EMERGENCY SITUATION ARISES AND YOU ARE UNABLE TO REACH   YOUR PHYSICIAN - GO DIRECTLY TO THE EMERGENCY ROOM.  Jose Mckeon MD  6/26/2023 3:45:05 PM  This report has been verified and signed electronically.  Dear patient,  As a result of recent federal legislation (The Federal Cures Act), you may   receive lab or pathology results from your procedure in your [facility]   account before your physician is able to contact you. Your physician or   their representative will relay the results to you with their   recommendations at their  soonest availability.  Thank you,  [employer]ATION

## 2023-06-26 NOTE — ASSESSMENT & PLAN NOTE
· Home BP meds o/h given possible GIB  · Will resume when cleared by GI    - 6/26   - we will resume home BP meds

## 2023-06-26 NOTE — ASSESSMENT & PLAN NOTE
· Associated with b/l flank pain L>R, malodorous urine, and urgency  · UA positive for 3+ leukocytes, many bacteria, >100 WBC, and many WBC clumps  · Continue rocephin  · Urine cx in process  · Blood cx in process  · PRN analgesics for flank pain

## 2023-06-26 NOTE — PROGRESS NOTES
[facility] Medicine  Progress Note    Patient Name: Alex Lovell  MRN: [id number]  Patient Class: OP- Observation   Admission Date: 6/25/2023  Length of Stay: 0 days  Attending Physician: Erendira Salmon MD  Primary Care Provider: Primary Doctor No        Subjective:     Principal Problem:Pyelonephritis        HPI:  Alex Lovell is a 72 y.o. female with PMH of DMT2, HTN, hypothyroidism, lung cancer, anemia, and HLD. She presented to the emergency department today with a complaint of bilateral flank pain. Left worse than right. She reports some pain with urination, foul odor of urine, and urgency.  She denies any CP, SOB, hematuria, abd pain, N/V/D, fevers, chills, or diaphoresis. Pain is a cramping pain, worse with movement. She reports that she flew into [place] from [place] for a family reunion and started feeling bad a couple of days ago. She states the pain just continued to get worse so she decided to come in for evaluation. UA positive for UTI. Patient also noted to have H/H 8.5/29.2 on initial labs, which is lower than her baseline. Patient reports that she did notice that her stools have been darker than usual. She also admitted to experiencing dizziness, lightheadedness, and fatigue. GI contacted by ED physician and have recommended protonix BID and NPO after MN for possible intervention.       Overview/Hospital Course:  No notes on file    Interval History:  Patient resting in bed, reports shortness of breath secondary to COPD, complains of severe right left upper quadrant pain to palpation, awaiting possible EGD per GI Medicine.     Review of Systems   Constitutional:  Positive for fatigue. Negative for appetite change.   HENT: Negative.     Respiratory:  Positive for shortness of breath.    Cardiovascular:  Negative for chest pain and palpitations.   Gastrointestinal:  Positive for abdominal pain, nausea and vomiting.   Genitourinary:  Negative for dysuria.   Neurological:   Negative for dizziness and light-headedness.     Objective:     Vital Signs (Most Recent):  Temp: 98.6 °F (37 °C) (06/26/23 1703)  Pulse: 90 (06/26/23 1703)  Resp: 20 (06/26/23 1703)  BP: (!) 150/84 (06/26/23 1703)  SpO2: (!) 94 % (06/26/23 1703) Vital Signs (24h Range):  Temp:  [96.4 °F (35.8 °C)-98.6 °F (37 °C)] 98.6 °F (37 °C)  Pulse:  [] 90  Resp:  [16-22] 20  SpO2:  [94 %-100 %] 94 %  BP: (125-181)/() 150/84     Weight: 97.4 kg (214 lb 11.7 oz)  Body mass index is 39.27 kg/m².    Intake/Output Summary (Last 24 hours) at 6/26/2023 1705  Last data filed at 6/26/2023 1546  Gross per 24 hour   Intake 250 ml   Output --   Net 250 ml         Physical Exam  Constitutional:       Appearance: Normal appearance. She is obese. She is ill-appearing.   HENT:      Head: Normocephalic and atraumatic.      Mouth/Throat:      Mouth: Mucous membranes are moist.   Eyes:      Extraocular Movements: Extraocular movements intact.   Cardiovascular:      Rate and Rhythm: Normal rate and regular rhythm.      Pulses: Normal pulses.      Heart sounds: Normal heart sounds.   Pulmonary:      Effort: No respiratory distress.      Breath sounds: Normal breath sounds.   Abdominal:      General: Abdomen is flat. Bowel sounds are normal.      Palpations: Abdomen is soft.      Tenderness: There is abdominal tenderness (LUQ).   Genitourinary:     Comments: Pt voids   Musculoskeletal:         General: Normal range of motion.      Cervical back: Normal range of motion.   Skin:     General: Skin is warm and dry.   Neurological:      Mental Status: She is alert and oriented to person, place, and time.   Psychiatric:         Mood and Affect: Mood normal.           Significant Labs: All pertinent labs within the past 24 hours have been reviewed.  Bilirubin:   Recent Labs   Lab 06/25/23  1815   BILITOT 0.6     Blood Culture:   Recent Labs   Lab 06/25/23  2209   LABBLOO No Growth to date  No Growth to date     CBC:   Recent Labs   Lab  06/25/23  1815 06/26/23  0344   WBC 9.64 9.19   HGB 8.5* 8.2*   HCT 29.2* 27.7*    377     CMP:   Recent Labs   Lab 06/25/23  1815 06/26/23  0344    138   K 3.8 4.0    106   CO2 18* 20*   * 131*   BUN 23 26*   CREATININE 1.4 1.5*   CALCIUM 9.1 9.0   PROT 8.0  --    ALBUMIN 3.6  --    BILITOT 0.6  --    ALKPHOS 86  --    AST 26  --    ALT 17  --    ANIONGAP 13 12     Urine Culture: No results for input(s): LABURIN in the last 48 hours.  Urine Studies:   Recent Labs   Lab 06/25/23  1803   COLORU Yellow   APPEARANCEUA Cloudy*   PHUR 6.0   SPECGRAV 1.020   PROTEINUA 1+*   GLUCUA 4+*   KETONESU Negative   BILIRUBINUA Negative   OCCULTUA 1+*   NITRITE Positive*   UROBILINOGEN Negative   LEUKOCYTESUR 3+*   RBCUA 9*   WBCUA >100*   BACTERIA Many*   SQUAMEPITHEL 2   HYALINECASTS 0       Significant Imaging: I have reviewed all pertinent imaging results/findings within the past 24 hours.      Assessment/Plan:      * Pyelonephritis  · Associated with b/l flank pain L>R, malodorous urine, and urgency  · UA positive for 3+ leukocytes, many bacteria, >100 WBC, and many WBC clumps  · Continue rocephin  · Urine cx in process  · Blood cx in process  · PRN analgesics for flank pain    6/26  - blood culture 2/2 ngtd  - urine culture pending  - continue ceftriaxone    Hypothyroidism, unspecified  · Continue synthroid      Hyperlipidemia  · Continue statin      Hypertension  · Home BP meds o/h given possible GIB  · Will resume when cleared by GI    - 6/26   - we will resume home BP meds      Depression  Patient has persistent depression which is unknown and is currently controlled. Will Continue anti-depressant medications. We will not consult psychiatry at this time. Patient does not display psychosis at this time. Continue to monitor closely and adjust plan of care as needed.        DM (diabetes mellitus)  Patient's FSGs are controlled on current medication regimen.  Last A1c reviewed- No results found for:  LABA1C, HGBA1C  Most recent glucose reviewed- 195  Current correctional scale  Medium  Maintain anti-hyperglycemic dose as follows-   Antihyperglycemics (From admission, onward)    Start     Stop Route Frequency Ordered    06/26/23 0027  insulin aspart U-100 pen 1-10 Units         -- SubQ Every 6 hours PRN 06/25/23 2327        Hold Oral hypoglycemics while patient is in the hospital.    6/26   - we will check hemoglobin A1c in a.m.    Melena  GI following  - patient is on iron and Eliquis at home  - will check FOBT      Anemia  · H/H 8.5/29.2  · Baseline H/H 1/2022 12.1/39.2  · Suspected 2/2 GIB in the setting of new onset dark stools  · Occult stool pending  · GI consulted  · Recommendation to give protonix 40 mg BID and keep NPO after MN for evaluation  · Maintain telemetry  · Transfuse for Hgb <7  · Continue PO iron      6/26  - hemoglobin stable 8.2  - GI consult, EGD today, recommendations     - large hiatal hernia, with large amount of food in the stomach     - single angioectasia in the duodenum treated with APC     - advance diet as tolerated     - continue present medications     - Protonix twice a day for 2 months     -  hold Eliquis for another 3 days then okay to resume     - repeat UGI in 2 months to assess rest of, recommend liquids 24 hours prior to procedure               VTE Risk Mitigation (From admission, onward)         Ordered     IP VTE HIGH RISK PATIENT  Once         06/25/23 2155     Place sequential compression device  Until discontinued         06/25/23 2155     Reason for No Pharmacological VTE Prophylaxis  Once        Question:  Reasons:  Answer:  Active Bleeding    06/25/23 2155                Discharge Planning   KLAUDIA:      Code Status: Full Code   Is the patient medically ready for discharge?:     Reason for patient still in hospital (select all that apply): Patient trending condition, Laboratory test, Treatment and Consult recommendations  Discharge Plan A: Home with family                   Yanet Jarrett NP  Department of Hospital Medicine   [facility]

## 2023-06-26 NOTE — PLAN OF CARE
SOCIAL WORK DISCHARGE PLANNING ASSESSMENT    Sw completed discharge planning assessment with pt in pt's room K464. Pt was easily engaged and education on the role of  was provided. Pt reported she lives with her daughter in [place], [place]. Pt stated she is in [place] for her family reunion. Pt reported she has lots of family here and will be staying here for a while upon discharge. Pt reported she has the following DME: rollator, straight cane and shower chair. Pt stated she is not current with  services. Pt stated she takes medicaid transportation to get to doctor appointments and pt's niece will most likely provide transportation to family home following discharge. Pt was encouraged to call with any questions or concerns. Pt verbalized understanding.       Patient Active Problem List   Diagnosis    Pyelonephritis    Anemia    Melena    DM (diabetes mellitus)    Depression    Hypertension    Hyperlipidemia    Hypothyroidism, unspecified        06/26/23 1134   Discharge Assessment   Assessment Type Discharge Planning Assessment   Confirmed/corrected address, phone number and insurance Yes   Confirmed Demographics Correct on Facesheet   Source of Information patient   Communicated KLAUDIA with patient/caregiver Date not available/Unable to determine   Do you expect to return to your current living situation? Yes   Do you have help at home or someone to help you manage your care at home? Yes   Who are your caregiver(s) and their phone number(s)? pt's daughter   Prior to hospitilization cognitive status: Alert/Oriented   Current cognitive status: Alert/Oriented   Equipment Currently Used at Home rollator;cane, straight   Readmission within 30 days? No   Patient currently being followed by outpatient case management? No   Do you currently have service(s) that help you manage your care at home? No   Do you take prescription medications? Yes   Do you have prescription coverage? Yes   Coverage [place]  Medicaid   Do you have any problems affording any of your prescribed medications? No   Is the patient taking medications as prescribed? yes   Who is going to help you get home at discharge? pt's niece   How do you get to doctors appointments? health plan transportation   Are you on dialysis? No   Do you take coumadin? Yes   Discharge Plan A Home with family   DME Needed Upon Discharge    (TBD)   Discharge Plan discussed with: Patient   Transition of Care Barriers None

## 2023-06-26 NOTE — ASSESSMENT & PLAN NOTE
Patient's FSGs are controlled on current medication regimen.  Last A1c reviewed- No results found for: LABA1C, HGBA1C  Most recent glucose reviewed- 195  Current correctional scale  Medium  Maintain anti-hyperglycemic dose as follows-   Antihyperglycemics (From admission, onward)    Start     Stop Route Frequency Ordered    06/26/23 0027  insulin aspart U-100 pen 1-10 Units         -- SubQ Every 6 hours PRN 06/25/23 2327        Hold Oral hypoglycemics while patient is in the hospital.    6/26   - we will check hemoglobin A1c in a.m.

## 2023-06-26 NOTE — NURSING
Called CT for STAT CTA Chest. CT states 6 patients in front of this patient with STAT orders. Notified provider. Provider ok with wait. NADN. Continue POC.

## 2023-06-26 NOTE — ASSESSMENT & PLAN NOTE
· H/H 8.5/29.2  · Baseline H/H 1/2022 12.1/39.2  · Suspected 2/2 GIB in the setting of new onset dark stools  · Occult stool pending  · GI consulted  · Recommendation to give protonix 40 mg BID and keep NPO after MN for evaluation  · Maintain telemetry  · Transfuse for Hgb <7  · Continue PO iron

## 2023-06-26 NOTE — PLAN OF CARE
06/25/23 2318   Admission   Initial VN Admission Questions Complete   Communication Issues? None   Shift   Pain Management Interventions quiet environment facilitated;relaxation techniques promoted   Virtual Nurse - Patient Verbalized Approval Of Camera Use;VN Rounding   Type of Frequent Check   Type Telemetry Monitoring   Safety/Activity   Patient Rounds bed in low position;placement of personal items at bedside;bed wheels locked;call light in patient/parent reach;visualized patient;clutter free environment maintained;ID band on   Safety Promotion/Fall Prevention assistive device/personal item within reach;bed alarm set;instructed to call staff for mobility;room near unit station;nonskid shoes/socks when out of bed;side rails raised x 2;medications reviewed;Fall Risk reviewed with patient/family   Safety Precautions emergency equipment at bedside   Activity Management Sitting at edge of bed - L2   Activity Assistance Provided independent    VN cued into room to complete admit assessment. VIP model introduced; VN working alongside bedside treatment team.  Plan of care reviewed with patient. Patient informed of fall risk, fall precautions, call light within reach, side rails x2 elevated. Patient notified to ask staff for assistance. Patient verbalized complete understanding. Time allowed for questions. Will continue to monitor and intervene as needed.

## 2023-06-26 NOTE — HPI
Alex Lovell is a 72 y.o. female with PMH of DMT2, HTN, hypothyroidism, lung cancer, anemia, and HLD. She presented to the emergency department today with a complaint of bilateral flank pain. Left worse than right. She reports some pain with urination, foul odor of urine, and urgency.  She denies any CP, SOB, hematuria, abd pain, N/V/D, fevers, chills, or diaphoresis. Pain is a cramping pain, worse with movement. She reports that she flew into [place] from [place] for a family reunion and started feeling bad a couple of days ago. She states the pain just continued to get worse so she decided to come in for evaluation. UA positive for UTI. Patient also noted to have H/H 8.5/29.2 on initial labs, which is lower than her baseline. Patient reports that she did notice that her stools have been darker than usual. She also admitted to experiencing dizziness, lightheadedness, and fatigue. GI contacted by ED physician and have recommended protonix BID and NPO after MN for possible intervention.

## 2023-06-26 NOTE — ED NOTES
Pt brought to restroom by RN via wheel chair. Pt advised to pull call cord when finished. Pt verbalized understanding.

## 2023-06-26 NOTE — SUBJECTIVE & OBJECTIVE
Past Medical History:   Diagnosis Date    Asthma     Atherosclerosis of aorta     Cancer     lung adenoCA stage IA ([id number]) dx 7/2012    COPD (chronic obstructive pulmonary disease)     Depression     Diabetes mellitus     Esophagitis     Hiatal hernia     Hyperlipidemia     Hypertension     Hypothyroidism, unspecified     Insomnia     Iron deficiency anemia, unspecified     Morbid obesity     Sleep apnea        Past Surgical History:   Procedure Laterality Date    RESECTION, LUNG Left     LLL wedge resection       Review of patient's allergies indicates:  No Known Allergies  Family History    None       Tobacco Use    Smoking status: Never    Smokeless tobacco: Never   Substance and Sexual Activity    Alcohol use: Not Currently    Drug use: Never    Sexual activity: Not Currently     Review of Systems   Constitutional:  Positive for appetite change and fatigue. Negative for chills and fever.   HENT:  Negative for mouth sores and nosebleeds.    Eyes:  Negative for pain and redness.   Respiratory:  Negative for cough and shortness of breath.    Cardiovascular:  Negative for chest pain and palpitations.   Gastrointestinal:  Positive for blood in stool. Negative for vomiting.   Genitourinary:  Negative for dysuria and hematuria.   Musculoskeletal:  Negative for arthralgias and joint swelling.   Neurological:  Negative for seizures and facial asymmetry.   Psychiatric/Behavioral:  Negative for agitation and confusion.    Objective:     Vital Signs (Most Recent):  Temp: 98.6 °F (37 °C) (06/26/23 1122)  Pulse: 105 (06/26/23 1213)  Resp: 18 (06/26/23 1220)  BP: 130/84 (06/26/23 1122)  SpO2: 98 % (06/26/23 1130) Vital Signs (24h Range):  Temp:  [96.4 °F (35.8 °C)-98.7 °F (37.1 °C)] 98.6 °F (37 °C)  Pulse:  [] 105  Resp:  [16-22] 18  SpO2:  [96 %-100 %] 98 %  BP: (125-181)/() 130/84     Weight: 97.4 kg (214 lb 11.7 oz) (06/25/23 2246)  Body mass index is 39.27 kg/m².    No intake or output data in the 24 hours  ending 06/26/23 1338    Lines/Drains/Airways       Drain  Duration             Female External Urinary Catheter 06/25/23 2300 <1 day              Peripheral Intravenous Line  Duration                  Peripheral IV - Single Lumen 06/25/23 1817 20 G Right Antecubital <1 day                     Physical Exam  Vitals reviewed.   Constitutional:       General: She is not in acute distress.     Appearance: She is not diaphoretic.   HENT:      Head: Normocephalic and atraumatic.   Eyes:      General: No scleral icterus.     Conjunctiva/sclera: Conjunctivae normal.   Cardiovascular:      Rate and Rhythm: Normal rate.      Heart sounds: Normal heart sounds.   Pulmonary:      Effort: Pulmonary effort is normal. No respiratory distress.      Breath sounds: Normal breath sounds. No stridor.   Abdominal:      General: There is no distension.      Palpations: Abdomen is soft. There is no mass.      Tenderness: There is no abdominal tenderness. There is no guarding or rebound.   Musculoskeletal:         General: No tenderness or deformity.      Cervical back: Neck supple.   Lymphadenopathy:      Cervical: No cervical adenopathy.   Skin:     General: Skin is warm and dry.      Findings: No rash.   Neurological:      Mental Status: She is alert and oriented to person, place, and time.      Gait: Gait normal.   Psychiatric:         Mood and Affect: Mood normal.         Behavior: Behavior normal.        Significant Labs:  CBC:   Recent Labs   Lab 06/25/23 1815 06/26/23  0344   WBC 9.64 9.19   HGB 8.5* 8.2*   HCT 29.2* 27.7*    377     BMP:   Recent Labs   Lab 06/26/23  0344   *      K 4.0      CO2 20*   BUN 26*   CREATININE 1.5*   CALCIUM 9.0     CMP:   Recent Labs   Lab 06/25/23  1815 06/26/23  0344   * 131*   CALCIUM 9.1 9.0   ALBUMIN 3.6  --    PROT 8.0  --     138   K 3.8 4.0   CO2 18* 20*    106   BUN 23 26*   CREATININE 1.4 1.5*   ALKPHOS 86  --    ALT 17  --    AST 26  --     BILITOT 0.6  --        Significant Imaging:  Imaging results within the past 24 hours have been reviewed.

## 2023-06-26 NOTE — ED NOTES
Pt sitting on side of bed. Pt reports just returning from radiology. Pt reports slight relief of pain from 8 to 7. Pt requests water, water provided.

## 2023-06-26 NOTE — ASSESSMENT & PLAN NOTE
· H/H 8.5/29.2  · Baseline H/H 1/2022 12.1/39.2  · Suspected 2/2 GIB in the setting of new onset dark stools  · Occult stool pending  · GI consulted  · Recommendation to give protonix 40 mg BID and keep NPO after MN for evaluation  · Maintain telemetry  · Transfuse for Hgb <7  · Continue PO iron      6/26  - hemoglobin stable 8.2  - GI consult, EGD today, recommendations     - large hiatal hernia, with large amount of food in the stomach     - single angioectasia in the duodenum treated with APC     - advance diet as tolerated     - continue present medications     - Protonix twice a day for 2 months     -  hold Eliquis for another 3 days then okay to resume     - repeat UGI in 2 months to assess rest of, recommend liquids 24 hours prior to procedure

## 2023-06-26 NOTE — SUBJECTIVE & OBJECTIVE
Past Medical History:   Diagnosis Date    Asthma     Atherosclerosis of aorta     Cancer     lung adenoCA stage IA ([id number]) dx 7/2012    COPD (chronic obstructive pulmonary disease)     Depression     Diabetes mellitus     Esophagitis     Hiatal hernia     Hyperlipidemia     Hypertension     Hypothyroidism, unspecified     Insomnia     Iron deficiency anemia, unspecified     Morbid obesity     Sleep apnea        Past Surgical History:   Procedure Laterality Date    RESECTION, LUNG Left     LLL wedge resection       Review of patient's allergies indicates:  No Known Allergies    No current facility-administered medications on file prior to encounter.     Current Outpatient Medications on File Prior to Encounter   Medication Sig    amLODIPine (NORVASC) 5 MG tablet Take 5 mg by mouth.    atorvastatin (LIPITOR) 40 MG tablet Take 40 mg by mouth.    carvediloL (COREG) 3.125 MG tablet Take 3.125 mg by mouth 2 (two) times daily.    DULoxetine (CYMBALTA) 30 MG capsule Take 30 mg by mouth 2 (two) times daily.    ELIQUIS 5 mg Tab Take 5 mg by mouth 2 (two) times daily.    ferrous sulfate (FEOSOL) 325 mg (65 mg iron) Tab tablet Take 325 mg by mouth 2 (two) times daily.    gabapentin (NEURONTIN) 300 MG capsule Take by mouth.    JARDIANCE 25 mg tablet Take 25 mg by mouth.    levothyroxine (SYNTHROID) 112 MCG tablet Take 112 mcg by mouth.    losartan (COZAAR) 50 MG tablet Take 50 mg by mouth.    metFORMIN (GLUCOPHAGE) 500 MG tablet Take 500 mg by mouth 2 (two) times daily.    omeprazole (PRILOSEC) 20 MG capsule Take 20 mg by mouth 2 (two) times daily.    sulfamethoxazole-trimethoprim 800-160mg (BACTRIM DS) 800-160 mg Tab Take 1 tablet by mouth 2 (two) times daily.    SYMBICORT 160-4.5 mcg/actuation HFAA Inhale into the lungs.    VENTOLIN HFA 90 mcg/actuation inhaler Inhale into the lungs.    VICTOZA 3-ANTONIO 0.6 mg/0.1 mL (18 mg/3 mL) PnIj pen Inject into the skin.     Family History    None       Tobacco Use    Smoking status:  Never    Smokeless tobacco: Never   Substance and Sexual Activity    Alcohol use: Not Currently    Drug use: Never    Sexual activity: Not Currently     Review of Systems   Constitutional:  Positive for fatigue. Negative for chills, diaphoresis and fever.   HENT:  Negative for trouble swallowing.    Respiratory:  Negative for shortness of breath.    Cardiovascular:  Negative for chest pain.   Gastrointestinal:  Negative for diarrhea, nausea and vomiting.        Dark stools   Genitourinary:  Positive for flank pain and urgency.        Malodorous urine   Musculoskeletal:  Negative for gait problem.   Skin:  Negative for color change.   Neurological:  Positive for dizziness and light-headedness. Negative for tremors and headaches.   Psychiatric/Behavioral:  Negative for agitation and confusion. The patient is not nervous/anxious.    Objective:     Vital Signs (Most Recent):  Temp: 97.3 °F (36.3 °C) (06/25/23 2246)  Pulse: 95 (06/25/23 2246)  Resp: 20 (06/25/23 2246)  BP: (!) 144/83 (06/25/23 2246)  SpO2: 100 % (06/25/23 2246) Vital Signs (24h Range):  Temp:  [97.3 °F (36.3 °C)-98.7 °F (37.1 °C)] 97.3 °F (36.3 °C)  Pulse:  [] 95  Resp:  [20] 20  SpO2:  [98 %-100 %] 100 %  BP: (133-181)/() 144/83     Weight: 97.4 kg (214 lb 11.7 oz)  Body mass index is 39.27 kg/m².     Physical Exam  Vitals and nursing note reviewed.   Constitutional:       General: She is not in acute distress.     Appearance: She is ill-appearing.   HENT:      Head: Normocephalic.      Nose: Nose normal.      Mouth/Throat:      Mouth: Mucous membranes are moist.   Eyes:      Pupils: Pupils are equal, round, and reactive to light.   Cardiovascular:      Rate and Rhythm: Normal rate and regular rhythm.      Pulses: Normal pulses.      Heart sounds: Normal heart sounds.   Pulmonary:      Effort: Pulmonary effort is normal. No respiratory distress.      Breath sounds: Normal breath sounds. No wheezing or rhonchi.   Abdominal:      General:  Bowel sounds are normal. There is no distension.      Palpations: Abdomen is soft.      Tenderness: There is no abdominal tenderness. There is right CVA tenderness and left CVA tenderness. There is no guarding.      Hernia: No hernia is present.   Musculoskeletal:         General: Normal range of motion.      Cervical back: Normal range of motion.      Right lower leg: No edema.      Left lower leg: No edema.   Skin:     General: Skin is warm.   Neurological:      General: No focal deficit present.      Mental Status: She is alert and oriented to person, place, and time.   Psychiatric:         Mood and Affect: Mood normal.         Behavior: Behavior normal.         Thought Content: Thought content normal.         Judgment: Judgment normal.            CRANIAL NERVES     CN III, IV, VI   Pupils are equal, round, and reactive to light.     Significant Labs: All pertinent labs within the past 24 hours have been reviewed.    Significant Imaging: I have reviewed all pertinent imaging results/findings within the past 24 hours.

## 2023-06-26 NOTE — ASSESSMENT & PLAN NOTE
With assoc symptomatic anemia  Hx of PUD in past    Recs:  Protonix IV BID  Intravascular resuscitation/support with IVFs   Serial H/H's and pRBCs transfusion as indicated  Discontinue all NSAIDs and Heparin products  Please correct any coagulopathy with platelets and FFP to a goal of platelets >50K and INR <2.0  Maintain IV access with 2 large bore IVs  Npo now  Plan for EGD today    Please notify GI team if there is significant change in patient's clinical status

## 2023-06-26 NOTE — H&P
[facility] Medicine  History & Physical    Patient Name: Alex Lovell  MRN: [id number]  Patient Class: OP- Observation  Admission Date: 6/25/2023  Attending Physician: Erendira Salmon MD   Primary Care Provider: No primary care provider on file.         Patient information was obtained from patient, past medical records and ER records.     Subjective:     Principal Problem:Pyelonephritis    Chief Complaint:   Chief Complaint   Patient presents with    Abdominal Pain     LUQ, abd pain that started yesterday; denies n/v and diarrhea. LBM yesterday, and stool was dark. HX of  DM, HTN, COPD, Hyperthyroidism, hyperlipidemia, bronchitis, and anemia. No other symptoms at this time.  7/10 pain.         HPI: Alex Lovell is a 72 y.o. female with PMH of DMT2, HTN, hypothyroidism, lung cancer, anemia, and HLD. She presented to the emergency department today with a complaint of bilateral flank pain. Left worse than right. She reports some pain with urination, foul odor of urine, and urgency.  She denies any CP, SOB, hematuria, abd pain, N/V/D, fevers, chills, or diaphoresis. Pain is a cramping pain, worse with movement. She reports that she flew into [place] from [place] for a family reunion and started feeling bad a couple of days ago. She states the pain just continued to get worse so she decided to come in for evaluation. UA positive for UTI. Patient also noted to have H/H 8.5/29.2 on initial labs, which is lower than her baseline. Patient reports that she did notice that her stools have been darker than usual. She also admitted to experiencing dizziness, lightheadedness, and fatigue. GI contacted by ED physician and have recommended protonix BID and NPO after MN for possible intervention.       Past Medical History:   Diagnosis Date    Asthma     Atherosclerosis of aorta     Cancer     lung adenoCA stage IA ([id number]) dx 7/2012    COPD (chronic obstructive pulmonary disease)     Depression      Diabetes mellitus     Esophagitis     Hiatal hernia     Hyperlipidemia     Hypertension     Hypothyroidism, unspecified     Insomnia     Iron deficiency anemia, unspecified     Morbid obesity     Sleep apnea        Past Surgical History:   Procedure Laterality Date    RESECTION, LUNG Left     LLL wedge resection       Review of patient's allergies indicates:  No Known Allergies    No current facility-administered medications on file prior to encounter.     Current Outpatient Medications on File Prior to Encounter   Medication Sig    amLODIPine (NORVASC) 5 MG tablet Take 5 mg by mouth.    atorvastatin (LIPITOR) 40 MG tablet Take 40 mg by mouth.    carvediloL (COREG) 3.125 MG tablet Take 3.125 mg by mouth 2 (two) times daily.    DULoxetine (CYMBALTA) 30 MG capsule Take 30 mg by mouth 2 (two) times daily.    ELIQUIS 5 mg Tab Take 5 mg by mouth 2 (two) times daily.    ferrous sulfate (FEOSOL) 325 mg (65 mg iron) Tab tablet Take 325 mg by mouth 2 (two) times daily.    gabapentin (NEURONTIN) 300 MG capsule Take by mouth.    JARDIANCE 25 mg tablet Take 25 mg by mouth.    levothyroxine (SYNTHROID) 112 MCG tablet Take 112 mcg by mouth.    losartan (COZAAR) 50 MG tablet Take 50 mg by mouth.    metFORMIN (GLUCOPHAGE) 500 MG tablet Take 500 mg by mouth 2 (two) times daily.    omeprazole (PRILOSEC) 20 MG capsule Take 20 mg by mouth 2 (two) times daily.    sulfamethoxazole-trimethoprim 800-160mg (BACTRIM DS) 800-160 mg Tab Take 1 tablet by mouth 2 (two) times daily.    SYMBICORT 160-4.5 mcg/actuation HFAA Inhale into the lungs.    VENTOLIN HFA 90 mcg/actuation inhaler Inhale into the lungs.    VICTOZA 3-ANTONIO 0.6 mg/0.1 mL (18 mg/3 mL) PnIj pen Inject into the skin.     Family History    None       Tobacco Use    Smoking status: Never    Smokeless tobacco: Never   Substance and Sexual Activity    Alcohol use: Not Currently    Drug use: Never    Sexual activity: Not Currently     Review of Systems    Constitutional:  Positive for fatigue. Negative for chills, diaphoresis and fever.   HENT:  Negative for trouble swallowing.    Respiratory:  Negative for shortness of breath.    Cardiovascular:  Negative for chest pain.   Gastrointestinal:  Negative for diarrhea, nausea and vomiting.        Dark stools   Genitourinary:  Positive for flank pain and urgency.        Malodorous urine   Musculoskeletal:  Negative for gait problem.   Skin:  Negative for color change.   Neurological:  Positive for dizziness and light-headedness. Negative for tremors and headaches.   Psychiatric/Behavioral:  Negative for agitation and confusion. The patient is not nervous/anxious.    Objective:     Vital Signs (Most Recent):  Temp: 97.3 °F (36.3 °C) (06/25/23 2246)  Pulse: 95 (06/25/23 2246)  Resp: 20 (06/25/23 2246)  BP: (!) 144/83 (06/25/23 2246)  SpO2: 100 % (06/25/23 2246) Vital Signs (24h Range):  Temp:  [97.3 °F (36.3 °C)-98.7 °F (37.1 °C)] 97.3 °F (36.3 °C)  Pulse:  [] 95  Resp:  [20] 20  SpO2:  [98 %-100 %] 100 %  BP: (133-181)/() 144/83     Weight: 97.4 kg (214 lb 11.7 oz)  Body mass index is 39.27 kg/m².     Physical Exam  Vitals and nursing note reviewed.   Constitutional:       General: She is not in acute distress.     Appearance: She is ill-appearing.   HENT:      Head: Normocephalic.      Nose: Nose normal.      Mouth/Throat:      Mouth: Mucous membranes are moist.   Eyes:      Pupils: Pupils are equal, round, and reactive to light.   Cardiovascular:      Rate and Rhythm: Normal rate and regular rhythm.      Pulses: Normal pulses.      Heart sounds: Normal heart sounds.   Pulmonary:      Effort: Pulmonary effort is normal. No respiratory distress.      Breath sounds: Normal breath sounds. No wheezing or rhonchi.   Abdominal:      General: Bowel sounds are normal. There is no distension.      Palpations: Abdomen is soft.      Tenderness: There is no abdominal tenderness. There is right CVA tenderness and left  CVA tenderness. There is no guarding.      Hernia: No hernia is present.   Musculoskeletal:         General: Normal range of motion.      Cervical back: Normal range of motion.      Right lower leg: No edema.      Left lower leg: No edema.   Skin:     General: Skin is warm.   Neurological:      General: No focal deficit present.      Mental Status: She is alert and oriented to person, place, and time.   Psychiatric:         Mood and Affect: Mood normal.         Behavior: Behavior normal.         Thought Content: Thought content normal.         Judgment: Judgment normal.            CRANIAL NERVES     CN III, IV, VI   Pupils are equal, round, and reactive to light.     Significant Labs: All pertinent labs within the past 24 hours have been reviewed.    Significant Imaging: I have reviewed all pertinent imaging results/findings within the past 24 hours.    Assessment/Plan:     * Pyelonephritis  · Associated with b/l flank pain L>R, malodorous urine, and urgency  · UA positive for 3+ leukocytes, many bacteria, >100 WBC, and many WBC clumps  · Continue rocephin  · Urine cx in process  · Blood cx in process  · PRN analgesics for flank pain      Hypothyroidism, unspecified  · Continue synthroid      Hyperlipidemia  · Continue statin      Hypertension  · Home BP meds o/h given possible GIB  · Will resume when cleared by GI      Depression  Patient has persistent depression which is unknown and is currently controlled. Will Continue anti-depressant medications. We will not consult psychiatry at this time. Patient does not display psychosis at this time. Continue to monitor closely and adjust plan of care as needed.        DM (diabetes mellitus)  Patient's FSGs are controlled on current medication regimen.  Last A1c reviewed- No results found for: LABA1C, HGBA1C  Most recent glucose reviewed- 195  Current correctional scale  Medium  Maintain anti-hyperglycemic dose as follows-   Antihyperglycemics (From admission, onward)     Start     Stop Route Frequency Ordered    06/26/23 0027  insulin aspart U-100 pen 1-10 Units         -- SubQ Every 6 hours PRN 06/25/23 2327        Hold Oral hypoglycemics while patient is in the hospital.    Anemia  · H/H 8.5/29.2  · Baseline H/H 1/2022 12.1/39.2  · Suspected 2/2 GIB in the setting of new onset dark stools  · Occult stool pending  · GI consulted  · Recommendation to give protonix 40 mg BID and keep NPO after MN for evaluation  · Maintain telemetry  · Transfuse for Hgb <7  · Continue PO iron        VTE Risk Mitigation (From admission, onward)         Ordered     IP VTE HIGH RISK PATIENT  Once         06/25/23 2155     Place sequential compression device  Until discontinued         06/25/23 2155     Reason for No Pharmacological VTE Prophylaxis  Once        Question:  Reasons:  Answer:  Active Bleeding    06/25/23 2155                     On 06/26/2023, patient should be placed in hospital observation services under my care in collaboration with Erendira Salmon MD.      Octavia Valle NP  Department of Hospital Medicine  [facility]

## 2023-06-26 NOTE — SUBJECTIVE & OBJECTIVE
Interval History:  Patient resting in bed, reports shortness of breath secondary to COPD, complains of severe right left upper quadrant pain to palpation, awaiting possible EGD per GI Medicine.     Review of Systems   Constitutional:  Positive for fatigue. Negative for appetite change.   HENT: Negative.     Respiratory:  Positive for shortness of breath.    Cardiovascular:  Negative for chest pain and palpitations.   Gastrointestinal:  Positive for abdominal pain, nausea and vomiting.   Genitourinary:  Negative for dysuria.   Neurological:  Negative for dizziness and light-headedness.     Objective:     Vital Signs (Most Recent):  Temp: 98.6 °F (37 °C) (06/26/23 1703)  Pulse: 90 (06/26/23 1703)  Resp: 20 (06/26/23 1703)  BP: (!) 150/84 (06/26/23 1703)  SpO2: (!) 94 % (06/26/23 1703) Vital Signs (24h Range):  Temp:  [96.4 °F (35.8 °C)-98.6 °F (37 °C)] 98.6 °F (37 °C)  Pulse:  [] 90  Resp:  [16-22] 20  SpO2:  [94 %-100 %] 94 %  BP: (125-181)/() 150/84     Weight: 97.4 kg (214 lb 11.7 oz)  Body mass index is 39.27 kg/m².    Intake/Output Summary (Last 24 hours) at 6/26/2023 1705  Last data filed at 6/26/2023 1546  Gross per 24 hour   Intake 250 ml   Output --   Net 250 ml         Physical Exam  Constitutional:       Appearance: Normal appearance. She is obese. She is ill-appearing.   HENT:      Head: Normocephalic and atraumatic.      Mouth/Throat:      Mouth: Mucous membranes are moist.   Eyes:      Extraocular Movements: Extraocular movements intact.   Cardiovascular:      Rate and Rhythm: Normal rate and regular rhythm.      Pulses: Normal pulses.      Heart sounds: Normal heart sounds.   Pulmonary:      Effort: No respiratory distress.      Breath sounds: Normal breath sounds.   Abdominal:      General: Abdomen is flat. Bowel sounds are normal.      Palpations: Abdomen is soft.      Tenderness: There is abdominal tenderness (LUQ).   Genitourinary:     Comments: Pt voids   Musculoskeletal:          General: Normal range of motion.      Cervical back: Normal range of motion.   Skin:     General: Skin is warm and dry.   Neurological:      Mental Status: She is alert and oriented to person, place, and time.   Psychiatric:         Mood and Affect: Mood normal.           Significant Labs: All pertinent labs within the past 24 hours have been reviewed.  Bilirubin:   Recent Labs   Lab 06/25/23 1815   BILITOT 0.6     Blood Culture:   Recent Labs   Lab 06/25/23 2209   LABBLOO No Growth to date  No Growth to date     CBC:   Recent Labs   Lab 06/25/23 1815 06/26/23  0344   WBC 9.64 9.19   HGB 8.5* 8.2*   HCT 29.2* 27.7*    377     CMP:   Recent Labs   Lab 06/25/23 1815 06/26/23 0344    138   K 3.8 4.0    106   CO2 18* 20*   * 131*   BUN 23 26*   CREATININE 1.4 1.5*   CALCIUM 9.1 9.0   PROT 8.0  --    ALBUMIN 3.6  --    BILITOT 0.6  --    ALKPHOS 86  --    AST 26  --    ALT 17  --    ANIONGAP 13 12     Urine Culture: No results for input(s): LABURIN in the last 48 hours.  Urine Studies:   Recent Labs   Lab 06/25/23 1803   COLORU Yellow   APPEARANCEUA Cloudy*   PHUR 6.0   SPECGRAV 1.020   PROTEINUA 1+*   GLUCUA 4+*   KETONESU Negative   BILIRUBINUA Negative   OCCULTUA 1+*   NITRITE Positive*   UROBILINOGEN Negative   LEUKOCYTESUR 3+*   RBCUA 9*   WBCUA >100*   BACTERIA Many*   SQUAMEPITHEL 2   HYALINECASTS 0       Significant Imaging: I have reviewed all pertinent imaging results/findings within the past 24 hours.   intact

## 2023-06-26 NOTE — ED PROVIDER NOTES
Chief Complaint: flank pain     History of Present Illness:    Alex Lovell 72 y.o. with a  has a past medical history of Asthma, Atherosclerosis of aorta, Cancer, COPD (chronic obstructive pulmonary disease), Depression, Diabetes mellitus, Esophagitis, Hiatal hernia, Hyperlipidemia, Hypertension, Hypothyroidism, unspecified, Insomnia, Iron deficiency anemia, unspecified, Morbid obesity, and Sleep apnea. who presents to the emergency department today with a complaint of bilateral flank pain. Left worse than right. She has some pain with urination.  She denies any hematuria.  Denies nausea, vomiting or diarrhea.  Pain is a cramping pain, worse with movement.  No fevers, chills or sweats.       ROS    Constitutional: No fever, no chills.  ENT: No nasal drainage. No ear ache. No sore throat.  Cardiovascular: No chest pain, no palpitations.  Musculoskeletal: No back pain.    Neurological: No headache, no focal weakness.    Otherwise remaining ROS negative     The history is provided by the patient      Reviewed and verified by myself:   PMH/PSH/SOC/FH REVIEWED :    Past Medical History:   Diagnosis Date    Asthma     Atherosclerosis of aorta     Cancer     lung adenoCA stage IA ([id number]) dx 7/2012    COPD (chronic obstructive pulmonary disease)     Depression     Diabetes mellitus     Esophagitis     Hiatal hernia     Hyperlipidemia     Hypertension     Hypothyroidism, unspecified     Insomnia     Iron deficiency anemia, unspecified     Morbid obesity     Sleep apnea        Past Surgical History:   Procedure Laterality Date    ESOPHAGOGASTRODUODENOSCOPY N/A 6/26/2023    Procedure: EGD (ESOPHAGOGASTRODUODENOSCOPY);  Surgeon: Jose Mckeon MD;  Location: [facility];  Service: Endoscopy;  Laterality: N/A;    RESECTION, LUNG Left     LLL wedge resection       Social History     Socioeconomic History    Marital status: Single   Tobacco Use    Smoking status: Never    Smokeless tobacco: Never   Substance and Sexual  Activity    Alcohol use: Not Currently    Drug use: Never    Sexual activity: Not Currently       History reviewed. No pertinent family history.            ALLERGIES REVIEWED  Review of patient's allergies indicates:  No Known Allergies    MEDICATIONS REVIEWED          VS reviewed    Nursing/Ancillary staff note reviewed.       Physical Exam     ED Triage Vitals [06/25/23 1702]   /62   Pulse 107   Resp 20   Temp 98.7 °F (37.1 °C)   SpO2 98 %       Physical Exam  Vitals and nursing note reviewed.   Constitutional:       General: She is not in acute distress.     Appearance: Normal appearance. She is well-developed.   HENT:      Head: Normocephalic and atraumatic.      Right Ear: External ear normal.      Left Ear: External ear normal.      Nose: Nose normal.      Mouth/Throat:      Mouth: Mucous membranes are moist.   Eyes:      General: No scleral icterus.        Right eye: No discharge.         Left eye: No discharge.      Conjunctiva/sclera: Conjunctivae normal.   Cardiovascular:      Rate and Rhythm: Normal rate and regular rhythm.   Pulmonary:      Effort: Pulmonary effort is normal. No respiratory distress.   Abdominal:      General: There is no distension.      Tenderness: There is right CVA tenderness and left CVA tenderness.   Musculoskeletal:         General: Normal range of motion.      Cervical back: Normal range of motion and neck supple.      Comments: Gait normal.    Skin:     General: Skin is warm and dry.   Neurological:      Mental Status: She is alert and oriented to person, place, and time.      Cranial Nerves: No cranial nerve deficit.      Motor: No abnormal muscle tone.   Psychiatric:         Mood and Affect: Mood normal.                 ED Course         ED Course as of 06/27/23 2135   Sun Jun 25, 2023 2008 Leukocytes, UA(!): 3+ [JA]   2008 NITRITE UA(!): Positive [JA]   2008 WBC, UA(!): >100 [JA]   2008 WBC Clumps, UA(!): Many [JA]   2008 Bacteria, UA(!): Many [JA]   2008 WBC:  9.64  CBC with anemia but not to the point where she will need a blood transfusion, no leukocytosis.    BMP unremarkable. BUN normal  [JA]   2102 Pts HGB from 1/2022 was 12.  Spoke with the pt and she does report 1 episode of black stool this morning.  Denies any previous episodes for black stools.  I will give her protonix bolus and place on Protonix BID out of caution for UGI.  Consult GI.   [JA]      ED Course User Index  [JA] Nery Blackmon MD            ED Management:          Medical Decision Making    Differential diagnosis included but not limited to : Musculoskeletal causes, kidney stone, pyelonephritis, shingles, and intra-abdominal causes such as diverticulitis and appendicitis, aortic dissection, abdominal aortic aneurysm, colitis, PE, pneumonia.       Initial: This is a 72 y.o. female  with the following co morbidities complicating her presentation   Diabetes mellitus, Iron deficiency anemia, unspecified, Morbid obesity, who comes in for the emergent evaluation of a new, acute, undiagnosed problem : bilateral flank pain.  On physical exam she has bilateral flank pain. VS stable.        Orders I ordered to further evaluate included:  CBC, CMP, UA, CT abd pelvis.  Treat her pain, monitor and reassess.       Social determinants of health taken into consideration during development of our treatment plan include   Body mass index is 40.28 kg/m². - Cumulative social disadvantage, denoted by higher SDOH burden, was associated with increased odds of obesity, independent of clinical and demographic factors. PMID: 42531982 DOI: 10.1002/beth.19047    Problems Addressed:  Acute upper GI bleed: complicated acute illness or injury with systemic symptoms that poses a threat to life or bodily functions  Anemia, unspecified type: complicated acute illness or injury  Melena: complicated acute illness or injury  Pyelonephritis: complicated acute illness or injury with systemic symptoms that poses a threat to life or  bodily functions    Amount and/or Complexity of Data Reviewed  External Data Reviewed: labs and notes.     Details: Admitted 1/2/22 for COVID + infection with SOB, received remdesivir 4 doses.  HGB at that time 12  Labs: ordered. Decision-making details documented in ED Course.  Radiology: ordered and independent interpretation performed.     Details: CT abd/pelvis - no stone, final read will be by radiology  Discussion of management or test interpretation with external provider(s): I spoke with Dr Graham with Hospital Medicine re: the pts presentation and workup, she accepts for admission.     I spoke with Dr Zimmerman on call for GI due to concerns for UGI bleed - they will follow along for     Risk  Prescription drug management.  Decision regarding hospitalization.      Pt received the following in the ED:   Medications   cefTRIAXone (ROCEPHIN) 1 g in dextrose 5 % in water (D5W) 5 % 100 mL IVPB (MB+) (0 g Intravenous Stopped 6/25/23 1942)   ketorolac injection 15 mg (15 mg Intravenous Given 6/25/23 1912)   morphine injection 2 mg (2 mg Intravenous Given 6/25/23 2016)   ondansetron injection 4 mg (4 mg Intravenous Given 6/25/23 2016)   pantoprazole injection 80 mg (80 mg Intravenous Given 6/25/23 2144)   iohexoL (OMNIPAQUE 350) injection 100 mL (100 mLs Intravenous Given 6/26/23 1843)           MDM continued:     Alex Lovell  presents to the emergency Department today with bilateral flank pain, anemia, she has pyelonephritis as seen on UA - no signs for infected stone by CT scan, she did have a drop in her H/H from 2022, she had 1 episode of melena today giving concerns for UBI Bleed.  Pt will be admitted for continued care and management, ABX for her pyelo, protonix for concerns of UGI.  GI will follow along and likely scope in AM.      Pt agrees with admission.           Voice recognition software utilized in this note.      Critical Care    Date/Time: 6/25/2023 9:44 PM  Performed by: Nery Blackmon,  MD  Authorized by: Nery Blackmon MD   Total critical care time (exclusive of procedural time) : 32 minutes  Critical care time was exclusive of separately billable procedures and treating other patients.  Critical care was necessary to treat or prevent imminent or life-threatening deterioration of the following conditions: Upper GI Bleed.  Critical care was time spent personally by me on the following activities: development of treatment plan with patient or surrogate, discussions with consultants, discussions with primary provider, evaluation of patient's response to treatment, examination of patient, obtaining history from patient or surrogate, ordering and performing treatments and interventions, ordering and review of laboratory studies, ordering and review of radiographic studies, pulse oximetry, re-evaluation of patient's condition and review of old charts.              Impression      The primary encounter diagnosis was Anemia, unspecified type. Diagnoses of Pyelonephritis, Chest pain, Melena, and Acute upper GI bleed were also pertinent to this visit.                       Nery Blackmon MD  06/27/23 8702

## 2023-06-27 LAB
ANION GAP SERPL CALC-SCNC: 11 MMOL/L (ref 8–16)
BASOPHILS # BLD AUTO: 0.05 K/UL (ref 0–0.2)
BASOPHILS NFR BLD: 0.5 % (ref 0–1.9)
BUN SERPL-MCNC: 18 MG/DL (ref 8–23)
CALCIUM SERPL-MCNC: 8.8 MG/DL (ref 8.7–10.5)
CHLORIDE SERPL-SCNC: 106 MMOL/L (ref 95–110)
CO2 SERPL-SCNC: 20 MMOL/L (ref 23–29)
CREAT SERPL-MCNC: 1.1 MG/DL (ref 0.5–1.4)
DIFFERENTIAL METHOD: ABNORMAL
EOSINOPHIL # BLD AUTO: 0.2 K/UL (ref 0–0.5)
EOSINOPHIL NFR BLD: 2.1 % (ref 0–8)
ERYTHROCYTE [DISTWIDTH] IN BLOOD BY AUTOMATED COUNT: 17.8 % (ref 11.5–14.5)
EST. GFR  (NO RACE VARIABLE): 53 ML/MIN/1.73 M^2
ESTIMATED AVG GLUCOSE: 174 MG/DL (ref 68–131)
GLUCOSE SERPL-MCNC: 127 MG/DL (ref 70–110)
HBA1C MFR BLD: 7.7 % (ref 4–5.6)
HCT VFR BLD AUTO: 26.7 % (ref 37–48.5)
HGB BLD-MCNC: 7.8 G/DL (ref 12–16)
IMM GRANULOCYTES # BLD AUTO: 0.05 K/UL (ref 0–0.04)
IMM GRANULOCYTES NFR BLD AUTO: 0.5 % (ref 0–0.5)
LYMPHOCYTES # BLD AUTO: 2 K/UL (ref 1–4.8)
LYMPHOCYTES NFR BLD: 20.3 % (ref 18–48)
MCH RBC QN AUTO: 22.9 PG (ref 27–31)
MCHC RBC AUTO-ENTMCNC: 29.2 G/DL (ref 32–36)
MCV RBC AUTO: 79 FL (ref 82–98)
MONOCYTES # BLD AUTO: 0.7 K/UL (ref 0.3–1)
MONOCYTES NFR BLD: 7.1 % (ref 4–15)
NEUTROPHILS # BLD AUTO: 6.7 K/UL (ref 1.8–7.7)
NEUTROPHILS NFR BLD: 69.5 % (ref 38–73)
NRBC BLD-RTO: 0 /100 WBC
PLATELET # BLD AUTO: 365 K/UL (ref 150–450)
PMV BLD AUTO: 8.6 FL (ref 9.2–12.9)
POCT GLUCOSE: 134 MG/DL (ref 70–110)
POCT GLUCOSE: 135 MG/DL (ref 70–110)
POCT GLUCOSE: 139 MG/DL (ref 70–110)
POCT GLUCOSE: 144 MG/DL (ref 70–110)
POTASSIUM SERPL-SCNC: 4.2 MMOL/L (ref 3.5–5.1)
RBC # BLD AUTO: 3.4 M/UL (ref 4–5.4)
SODIUM SERPL-SCNC: 137 MMOL/L (ref 136–145)
WBC # BLD AUTO: 9.6 K/UL (ref 3.9–12.7)

## 2023-06-27 PROCEDURE — 85025 COMPLETE CBC W/AUTO DIFF WBC: CPT | Performed by: NURSE PRACTITIONER

## 2023-06-27 PROCEDURE — 36415 COLL VENOUS BLD VENIPUNCTURE: CPT | Performed by: NURSE PRACTITIONER

## 2023-06-27 PROCEDURE — G0378 HOSPITAL OBSERVATION PER HR: HCPCS

## 2023-06-27 PROCEDURE — 63600175 PHARM REV CODE 636 W HCPCS: Performed by: EMERGENCY MEDICINE

## 2023-06-27 PROCEDURE — C9113 INJ PANTOPRAZOLE SODIUM, VIA: HCPCS | Performed by: EMERGENCY MEDICINE

## 2023-06-27 PROCEDURE — 25000003 PHARM REV CODE 250: Performed by: NURSE PRACTITIONER

## 2023-06-27 PROCEDURE — 80048 BASIC METABOLIC PNL TOTAL CA: CPT | Performed by: NURSE PRACTITIONER

## 2023-06-27 PROCEDURE — 63600175 PHARM REV CODE 636 W HCPCS: Performed by: NURSE PRACTITIONER

## 2023-06-27 PROCEDURE — 27000190 HC CPAP FULL FACE MASK W/VALVE

## 2023-06-27 PROCEDURE — 94640 AIRWAY INHALATION TREATMENT: CPT

## 2023-06-27 PROCEDURE — 99900035 HC TECH TIME PER 15 MIN (STAT)

## 2023-06-27 PROCEDURE — 83036 HEMOGLOBIN GLYCOSYLATED A1C: CPT | Performed by: NURSE PRACTITIONER

## 2023-06-27 PROCEDURE — 94761 N-INVAS EAR/PLS OXIMETRY MLT: CPT

## 2023-06-27 RX ORDER — PANTOPRAZOLE SODIUM 40 MG/1
40 TABLET, DELAYED RELEASE ORAL 2 TIMES DAILY
Status: DISCONTINUED | OUTPATIENT
Start: 2023-06-27 | End: 2023-06-29 | Stop reason: HOSPADM

## 2023-06-27 RX ORDER — PANTOPRAZOLE SODIUM 40 MG/1
40 TABLET, DELAYED RELEASE ORAL 2 TIMES DAILY
Qty: 120 TABLET | Refills: 0 | Status: SHIPPED | OUTPATIENT
Start: 2023-06-27 | End: 2024-06-26

## 2023-06-27 RX ORDER — APIXABAN 5 MG/1
5 TABLET, FILM COATED ORAL 2 TIMES DAILY
Qty: 60 TABLET | Refills: 2 | Status: SHIPPED | OUTPATIENT
Start: 2023-06-29

## 2023-06-27 RX ADMIN — HYDROCODONE BITARTRATE AND ACETAMINOPHEN 1 TABLET: 10; 325 TABLET ORAL at 02:06

## 2023-06-27 RX ADMIN — CEFTRIAXONE 1 G: 1 INJECTION, POWDER, FOR SOLUTION INTRAMUSCULAR; INTRAVENOUS at 06:06

## 2023-06-27 RX ADMIN — POLYETHYLENE GLYCOL 3350 17 G: 17 POWDER, FOR SOLUTION ORAL at 10:06

## 2023-06-27 RX ADMIN — FERROUS SULFATE TAB 325 MG (65 MG ELEMENTAL FE) 1 EACH: 325 (65 FE) TAB at 10:06

## 2023-06-27 RX ADMIN — FERROUS SULFATE TAB 325 MG (65 MG ELEMENTAL FE) 1 EACH: 325 (65 FE) TAB at 08:06

## 2023-06-27 RX ADMIN — PANTOPRAZOLE SODIUM 40 MG: 40 TABLET, DELAYED RELEASE ORAL at 12:06

## 2023-06-27 RX ADMIN — PANTOPRAZOLE SODIUM 40 MG: 40 TABLET, DELAYED RELEASE ORAL at 08:06

## 2023-06-27 RX ADMIN — FLUTICASONE FUROATE AND VILANTEROL TRIFENATATE 1 PUFF: 100; 25 POWDER RESPIRATORY (INHALATION) at 08:06

## 2023-06-27 RX ADMIN — Medication 6 MG: at 08:06

## 2023-06-27 RX ADMIN — ATORVASTATIN CALCIUM 40 MG: 40 TABLET, FILM COATED ORAL at 10:06

## 2023-06-27 RX ADMIN — DULOXETINE 30 MG: 30 CAPSULE, DELAYED RELEASE ORAL at 08:06

## 2023-06-27 RX ADMIN — GABAPENTIN 300 MG: 300 CAPSULE ORAL at 10:06

## 2023-06-27 RX ADMIN — SODIUM CHLORIDE: 9 INJECTION, SOLUTION INTRAVENOUS at 05:06

## 2023-06-27 RX ADMIN — HYDROCODONE BITARTRATE AND ACETAMINOPHEN 1 TABLET: 5; 325 TABLET ORAL at 06:06

## 2023-06-27 RX ADMIN — PANTOPRAZOLE SODIUM 40 MG: 40 INJECTION, POWDER, LYOPHILIZED, FOR SOLUTION INTRAVENOUS at 10:06

## 2023-06-27 RX ADMIN — SODIUM CHLORIDE: 9 INJECTION, SOLUTION INTRAVENOUS at 09:06

## 2023-06-27 RX ADMIN — LEVOTHYROXINE SODIUM 112 MCG: 112 TABLET ORAL at 05:06

## 2023-06-27 RX ADMIN — DULOXETINE 30 MG: 30 CAPSULE, DELAYED RELEASE ORAL at 10:06

## 2023-06-27 NOTE — CONSULTS
Thank you for your consult to [facility]. We have reviewed the patient chart. This patient does meet criteria for [facility] service at this time.  Will assume care on 06/28/23 at 7AM.

## 2023-06-27 NOTE — NURSING
PROACTIVE ROUNDING NOTE       Time of Visit:     Admit Date: 2023  LOS: 0  Code Status: Full Code   Date of Visit: 2023  : 1950  Age: 72 y.o.  Sex: female  Race: Black or   Bed: K464/K464 A:   MRN: [id number]  Was the patient discharged from an ICU this admission? No   Was the patient discharged from a PACU within last 24 hours? Yes   Did the patient receive conscious sedation/general anesthesia in last 24 hours? No   Was the patient in the ED within the past 24 hours? Yes   Was the patient on NIPPV within the past 24 hours? No   Attending Physician: Erendira Salmon MD  Primary Service: Hospitalist   Time spent at the bedside: 15 -30 min    SITUATION    Notified by N.P. request to be added to list  Reason for alert: New onset pain post EGD    Diagnosis: Pyelonephritis   has a past medical history of Asthma, Atherosclerosis of aorta, Cancer, COPD (chronic obstructive pulmonary disease), Depression, Diabetes mellitus, Esophagitis, Hiatal hernia, Hyperlipidemia, Hypertension, Hypothyroidism, unspecified, Insomnia, Iron deficiency anemia, unspecified, Morbid obesity, and Sleep apnea.    Last Vitals:  Temp: 96 °F (35.6 °C) (1946)  Pulse: 103 (2118)  Resp: 18 (2055)  BP: 139/76 (2118)  SpO2: 97 % (1956)    24 Hour Vitals Range:  Temp:  [96 °F (35.6 °C)-98.6 °F (37 °C)]   Pulse:  []   Resp:  [16-22]   BP: (125-187)/()   SpO2:  [92 %-100 %]     Clinical Issues: Respiratory    ASSESSMENT/INTERVENTIONS    Pt in bed, no distress noted.   Will add to RR team.    Disposition:Remain in room 464    FOLLOW UP    Call back the Rapid Response NursePriscilla at [phone] for additional questions or concerns.

## 2023-06-27 NOTE — ASSESSMENT & PLAN NOTE
Patient's FSGs are controlled on current medication regimen.  Last A1c reviewed-   Lab Results   Component Value Date    HGBA1C 7.7 (H) 06/27/2023     Most recent glucose reviewed- 195  Current correctional scale  Medium  Maintain anti-hyperglycemic dose as follows-   Antihyperglycemics (From admission, onward)    Start     Stop Route Frequency Ordered    06/26/23 0027  insulin aspart U-100 pen 1-10 Units         -- SubQ Every 6 hours PRN 06/25/23 2327        Hold Oral hypoglycemics while patient is in the hospital.

## 2023-06-27 NOTE — PLAN OF CARE
SW following pt today. Discharge orders noted. Pt's niece to  and transport to family home at discharge. Melina RN expressed concerns regarding npo status to medical team via secure chat. CM will continue to follow.       06/27/23 1104   Post-Acute Status   Post-Acute Authorization Other   Hospital Resources/Appts/Education Provided Community resources provided;Appointments scheduled by Navigator/Coordinator   Discharge Delays None known at this time   Discharge Plan   Discharge Plan A Home with family     [place]la [place]ervantes [facility] [place]  [phone]

## 2023-06-27 NOTE — NURSING
Rapid Response Nurse Follow-up Note   Team to sign off.  Please call Rapid Response RN, Aleksey Stoll RN with any questions or concerns at [phone].

## 2023-06-27 NOTE — PLAN OF CARE
Problem: Adult Inpatient Plan of Care  Goal: Optimal Comfort and Wellbeing  Outcome: Ongoing, Progressing  Intervention: Monitor Pain and Promote Comfort  Flowsheets (Taken 6/26/2023 2156)  Pain Management Interventions:   medication offered   pain management plan reviewed with patient/caregiver   position adjusted   quiet environment facilitated  Intervention: Provide Person-Centered Care  Flowsheets (Taken 6/26/2023 2156)  Trust Relationship/Rapport:   care explained   questions encouraged   choices provided   reassurance provided   emotional support provided   thoughts/feelings acknowledged   empathic listening provided   questions answered     Problem: Hypertension Comorbidity  Goal: Blood Pressure in Desired Range  Outcome: Ongoing, Progressing  Intervention: Maintain Blood Pressure Management  Flowsheets (Taken 6/26/2023 2156)  Medication Review/Management:   medications reviewed   provider consulted

## 2023-06-27 NOTE — PLAN OF CARE
SW informed by provider CAROL Parmar that pt awaiting urine labs and Physical Therapy, lunch. CM will continue to follow.       06/27/23 1121   Post-Acute Status   Post-Acute Authorization Other   Discharge Delays (!) Procedure Scheduling (IR, OR, Labs, Echo, Cath, Echo, EEG)   Discharge Plan   Discharge Plan A Home with family

## 2023-06-27 NOTE — SUBJECTIVE & OBJECTIVE
Interval History:  patient complaints of LUQ cramp. She otherwise denies complaints.       Objective:     Vital Signs (Most Recent):  Temp: 98.4 °F (36.9 °C) (06/27/23 0748)  Pulse: 85 (06/27/23 0809)  Resp: 18 (06/27/23 0809)  BP: (!) 142/81 (06/27/23 0748)  SpO2: (!) 93 % (06/27/23 0809) Vital Signs (24h Range):  Temp:  [96 °F (35.6 °C)-98.6 °F (37 °C)] 98.4 °F (36.9 °C)  Pulse:  [] 85  Resp:  [16-22] 18  SpO2:  [87 %-100 %] 93 %  BP: (127-187)/() 142/81     Weight: 99.9 kg (220 lb 3.8 oz)  Body mass index is 40.28 kg/m².    Intake/Output Summary (Last 24 hours) at 6/27/2023 1057  Last data filed at 6/27/2023 0455  Gross per 24 hour   Intake 250 ml   Output 1000 ml   Net -750 ml           Physical Exam  Constitutional:       Appearance: Normal appearance. She is obese. She is not ill-appearing.   HENT:      Head: Normocephalic and atraumatic.      Mouth/Throat:      Mouth: Mucous membranes are moist.   Eyes:      Extraocular Movements: Extraocular movements intact.   Cardiovascular:      Rate and Rhythm: Normal rate and regular rhythm.      Pulses: Normal pulses.      Heart sounds: Normal heart sounds.   Pulmonary:      Effort: No respiratory distress.      Breath sounds: Normal breath sounds.   Abdominal:      General: Abdomen is flat. Bowel sounds are normal.      Palpations: Abdomen is soft.      Tenderness: There is abdominal tenderness (LUQ).   Genitourinary:     Comments: Pt voids   Musculoskeletal:         General: Normal range of motion.      Cervical back: Normal range of motion.   Skin:     General: Skin is warm and dry.   Neurological:      Mental Status: She is alert and oriented to person, place, and time.   Psychiatric:         Mood and Affect: Mood normal.           Significant Labs: All pertinent labs within the past 24 hours have been reviewed.      Significant Imaging: I have reviewed all pertinent imaging results/findings within the past 24 hours.

## 2023-06-27 NOTE — ANESTHESIA POSTPROCEDURE EVALUATION
Anesthesia Post Evaluation    Patient: Alex Lovell    Procedure(s) Performed: Procedure(s) (LRB):  EGD (ESOPHAGOGASTRODUODENOSCOPY) (N/A)    Final Anesthesia Type: general      Patient location during evaluation: PACU  Patient participation: Yes- Able to Participate  Level of consciousness: awake and alert  Post-procedure vital signs: reviewed and stable  Pain management: adequate  Airway patency: patent    PONV status at discharge: No PONV  Anesthetic complications: no      Cardiovascular status: stable  Respiratory status: spontaneous ventilation  Hydration status: euvolemic  Follow-up not needed.          Vitals Value Taken Time   /81 06/27/23 0033   Temp 36.7 °C (98 °F) 06/27/23 0033   Pulse 99 06/27/23 0413   Resp 19 06/27/23 0259   SpO2 93 % 06/27/23 0056         Event Time   Out of Recovery 06/26/2023 16:48:34         Pain/Peri Score: Pain Rating Prior to Med Admin: 7 (6/27/2023  2:59 AM)  Pain Rating Post Med Admin: 3 (6/27/2023  3:59 AM)  Peri Score: 10 (6/26/2023  4:20 PM)

## 2023-06-27 NOTE — ASSESSMENT & PLAN NOTE
· Associated with b/l flank pain L>R, malodorous urine, and urgency  · UA positive for 3+ leukocytes, many bacteria, >100 WBC, and many WBC clumps  · Continue rocephin  · Urine cx in process  · Blood cx in process  · PRN analgesics for flank pain    6/26  - blood culture 2/2 ngtd  - urine culture pending  - continue ceftriaxone    6/27  Awaiting urine cx results to deescalate abx before discharge   Encouraged OOB to assist in the passing of flatus and gas pains

## 2023-06-28 PROBLEM — K31.811 GASTROINTESTINAL HEMORRHAGE ASSOCIATED WITH ANGIODYSPLASIA OF STOMACH AND DUODENUM: Status: ACTIVE | Noted: 2023-06-28

## 2023-06-28 LAB
ANION GAP SERPL CALC-SCNC: 12 MMOL/L (ref 8–16)
BASOPHILS # BLD AUTO: 0.04 K/UL (ref 0–0.2)
BASOPHILS NFR BLD: 0.4 % (ref 0–1.9)
BUN SERPL-MCNC: 11 MG/DL (ref 8–23)
CALCIUM SERPL-MCNC: 8.4 MG/DL (ref 8.7–10.5)
CHLORIDE SERPL-SCNC: 107 MMOL/L (ref 95–110)
CO2 SERPL-SCNC: 18 MMOL/L (ref 23–29)
CREAT SERPL-MCNC: 0.9 MG/DL (ref 0.5–1.4)
DIFFERENTIAL METHOD: ABNORMAL
EOSINOPHIL # BLD AUTO: 0.2 K/UL (ref 0–0.5)
EOSINOPHIL NFR BLD: 2 % (ref 0–8)
ERYTHROCYTE [DISTWIDTH] IN BLOOD BY AUTOMATED COUNT: 18.3 % (ref 11.5–14.5)
EST. GFR  (NO RACE VARIABLE): >60 ML/MIN/1.73 M^2
GLUCOSE SERPL-MCNC: 144 MG/DL (ref 70–110)
HCT VFR BLD AUTO: 26.2 % (ref 37–48.5)
HGB BLD-MCNC: 7.7 G/DL (ref 12–16)
IMM GRANULOCYTES # BLD AUTO: 0.05 K/UL (ref 0–0.04)
IMM GRANULOCYTES NFR BLD AUTO: 0.5 % (ref 0–0.5)
LYMPHOCYTES # BLD AUTO: 2.2 K/UL (ref 1–4.8)
LYMPHOCYTES NFR BLD: 23.9 % (ref 18–48)
MCH RBC QN AUTO: 22.7 PG (ref 27–31)
MCHC RBC AUTO-ENTMCNC: 29.4 G/DL (ref 32–36)
MCV RBC AUTO: 77 FL (ref 82–98)
MONOCYTES # BLD AUTO: 0.7 K/UL (ref 0.3–1)
MONOCYTES NFR BLD: 7.1 % (ref 4–15)
NEUTROPHILS # BLD AUTO: 6.1 K/UL (ref 1.8–7.7)
NEUTROPHILS NFR BLD: 66.1 % (ref 38–73)
NRBC BLD-RTO: 0 /100 WBC
PLATELET # BLD AUTO: 370 K/UL (ref 150–450)
PMV BLD AUTO: 8.7 FL (ref 9.2–12.9)
POCT GLUCOSE: 142 MG/DL (ref 70–110)
POCT GLUCOSE: 149 MG/DL (ref 70–110)
POCT GLUCOSE: 151 MG/DL (ref 70–110)
POCT GLUCOSE: 163 MG/DL (ref 70–110)
POTASSIUM SERPL-SCNC: 3.9 MMOL/L (ref 3.5–5.1)
RBC # BLD AUTO: 3.39 M/UL (ref 4–5.4)
SODIUM SERPL-SCNC: 137 MMOL/L (ref 136–145)
WBC # BLD AUTO: 9.19 K/UL (ref 3.9–12.7)

## 2023-06-28 PROCEDURE — 80048 BASIC METABOLIC PNL TOTAL CA: CPT | Performed by: NURSE PRACTITIONER

## 2023-06-28 PROCEDURE — 63600175 PHARM REV CODE 636 W HCPCS: Performed by: NURSE PRACTITIONER

## 2023-06-28 PROCEDURE — 85025 COMPLETE CBC W/AUTO DIFF WBC: CPT | Performed by: NURSE PRACTITIONER

## 2023-06-28 PROCEDURE — 99900035 HC TECH TIME PER 15 MIN (STAT)

## 2023-06-28 PROCEDURE — 25000003 PHARM REV CODE 250: Performed by: NURSE PRACTITIONER

## 2023-06-28 PROCEDURE — 11000001 HC ACUTE MED/SURG PRIVATE ROOM

## 2023-06-28 PROCEDURE — 94640 AIRWAY INHALATION TREATMENT: CPT

## 2023-06-28 PROCEDURE — 97530 THERAPEUTIC ACTIVITIES: CPT

## 2023-06-28 PROCEDURE — 36415 COLL VENOUS BLD VENIPUNCTURE: CPT | Performed by: NURSE PRACTITIONER

## 2023-06-28 PROCEDURE — 97161 PT EVAL LOW COMPLEX 20 MIN: CPT

## 2023-06-28 PROCEDURE — 94761 N-INVAS EAR/PLS OXIMETRY MLT: CPT

## 2023-06-28 PROCEDURE — 25000003 PHARM REV CODE 250: Performed by: HOSPITALIST

## 2023-06-28 PROCEDURE — 97165 OT EVAL LOW COMPLEX 30 MIN: CPT

## 2023-06-28 RX ORDER — AMOXICILLIN 250 MG
1 CAPSULE ORAL DAILY
Status: DISCONTINUED | OUTPATIENT
Start: 2023-06-28 | End: 2023-06-29 | Stop reason: HOSPADM

## 2023-06-28 RX ADMIN — ATORVASTATIN CALCIUM 40 MG: 40 TABLET, FILM COATED ORAL at 08:06

## 2023-06-28 RX ADMIN — DULOXETINE 30 MG: 30 CAPSULE, DELAYED RELEASE ORAL at 08:06

## 2023-06-28 RX ADMIN — LEVOTHYROXINE SODIUM 112 MCG: 112 TABLET ORAL at 06:06

## 2023-06-28 RX ADMIN — PANTOPRAZOLE SODIUM 40 MG: 40 TABLET, DELAYED RELEASE ORAL at 08:06

## 2023-06-28 RX ADMIN — HYDRALAZINE HYDROCHLORIDE 10 MG: 20 INJECTION, SOLUTION INTRAMUSCULAR; INTRAVENOUS at 04:06

## 2023-06-28 RX ADMIN — HYDROCODONE BITARTRATE AND ACETAMINOPHEN 1 TABLET: 10; 325 TABLET ORAL at 12:06

## 2023-06-28 RX ADMIN — INSULIN ASPART 2 UNITS: 100 INJECTION, SOLUTION INTRAVENOUS; SUBCUTANEOUS at 06:06

## 2023-06-28 RX ADMIN — HYDROCODONE BITARTRATE AND ACETAMINOPHEN 1 TABLET: 10; 325 TABLET ORAL at 04:06

## 2023-06-28 RX ADMIN — FLUTICASONE FUROATE AND VILANTEROL TRIFENATATE 1 PUFF: 100; 25 POWDER RESPIRATORY (INHALATION) at 07:06

## 2023-06-28 RX ADMIN — DOCUSATE SODIUM AND SENNOSIDES 1 TABLET: 8.6; 5 TABLET, FILM COATED ORAL at 09:06

## 2023-06-28 RX ADMIN — CEFTRIAXONE 1 G: 1 INJECTION, POWDER, FOR SOLUTION INTRAMUSCULAR; INTRAVENOUS at 06:06

## 2023-06-28 RX ADMIN — SODIUM CHLORIDE: 9 INJECTION, SOLUTION INTRAVENOUS at 06:06

## 2023-06-28 RX ADMIN — POLYETHYLENE GLYCOL 3350 17 G: 17 POWDER, FOR SOLUTION ORAL at 08:06

## 2023-06-28 RX ADMIN — HYDROCODONE BITARTRATE AND ACETAMINOPHEN 1 TABLET: 10; 325 TABLET ORAL at 06:06

## 2023-06-28 RX ADMIN — HYDROCODONE BITARTRATE AND ACETAMINOPHEN 1 TABLET: 10; 325 TABLET ORAL at 10:06

## 2023-06-28 RX ADMIN — GABAPENTIN 300 MG: 300 CAPSULE ORAL at 08:06

## 2023-06-28 RX ADMIN — FERROUS SULFATE TAB 325 MG (65 MG ELEMENTAL FE) 1 EACH: 325 (65 FE) TAB at 08:06

## 2023-06-28 NOTE — SUBJECTIVE & OBJECTIVE
Interval History: no acute events. Lying in bed with no complaints. She states she hasnt been able to get out of bed for the past 2 days.     Review of Systems   Constitutional:  Negative for chills, fatigue and fever.   HENT: Negative.     Eyes: Negative.    Respiratory:  Negative for cough and shortness of breath.    Cardiovascular:  Negative for chest pain, palpitations and leg swelling.   Gastrointestinal:  Negative for abdominal pain and vomiting.   Genitourinary: Negative.    Skin: Negative.    Neurological:  Negative for seizures and speech difficulty.   Psychiatric/Behavioral:  Negative for agitation and confusion. The patient is not nervous/anxious.    Objective:     Vital Signs (Most Recent):  Temp: 97.3 °F (36.3 °C) (06/28/23 0809)  Pulse: 95 (06/28/23 0809)  Resp: 18 (06/28/23 0809)  BP: (!) 150/80 (06/28/23 0809)  SpO2: 100 % (06/28/23 0809) Vital Signs (24h Range):  Temp:  [96.8 °F (36 °C)-98.3 °F (36.8 °C)] 97.3 °F (36.3 °C)  Pulse:  [] 95  Resp:  [14-20] 18  SpO2:  [90 %-100 %] 100 %  BP: (132-168)/(80-86) 150/80     Weight: 99.9 kg (220 lb 3.8 oz)  Body mass index is 40.28 kg/m².    Intake/Output Summary (Last 24 hours) at 6/28/2023 0931  Last data filed at 6/27/2023 2256  Gross per 24 hour   Intake 120 ml   Output 1850 ml   Net -1730 ml         Physical Exam  Vitals and nursing note reviewed.   Constitutional:       General: She is awake. She is not in acute distress.     Appearance: Normal appearance. She is well-developed and well-groomed. She is not ill-appearing, toxic-appearing or diaphoretic.   HENT:      Head: Normocephalic and atraumatic.   Eyes:      General: No scleral icterus.  Cardiovascular:      Rate and Rhythm: Normal rate.   Pulmonary:      Effort: No tachypnea or respiratory distress.   Musculoskeletal:      Right lower leg: No edema.      Left lower leg: No edema.   Skin:     Coloration: Skin is not jaundiced.   Neurological:      General: No focal deficit present.       Mental Status: She is alert and oriented to person, place, and time. Mental status is at baseline.   Psychiatric:         Attention and Perception: Attention normal.         Mood and Affect: Mood and affect normal.         Speech: Speech normal.         Behavior: Behavior normal. Behavior is cooperative.         Thought Content: Thought content normal.         Cognition and Memory: Cognition and memory normal. Cognition is not impaired. Memory is not impaired.         Judgment: Judgment normal.           Significant Labs: All pertinent labs within the past 24 hours have been reviewed.    Significant Imaging: I have reviewed all pertinent imaging results/findings within the past 24 hours.

## 2023-06-28 NOTE — PT/OT/SLP EVAL
"Physical Therapy Evaluation    Patient Name:  Alex Lovell   MRN:  [id number]    Recommendations:     Discharge Recommendations:  (moderate intensity therapy)   Discharge Equipment Recommendations:  (TBD)   Barriers to discharge:  requires increased physical assistance at this time    Assessment:     Alex Lovell is a 72 y.o. female admitted with a medical diagnosis of Pyelonephritis.  She presents with the following impairments/functional limitations: weakness, impaired endurance, impaired sensation, impaired self care skills, impaired functional mobility, gait instability, impaired balance, decreased ROM, pain, decreased safety awareness, decreased lower extremity function, decreased upper extremity function, decreased coordination, impaired cardiopulmonary response to activity, impaired joint extensibility Patient with symptomatic HOTN, increased pain LUQ, general weakness and requiring increased assistance for mobility; per pt report, she has not been OOB in 3 days; will benefit from continued acute PT services to maximize functional independence; will progress as able; recommending moderate intensity therapy upon d/c.    Rehab Prognosis: Good; patient would benefit from acute skilled PT services to address these deficits and reach maximum level of function.    Recent Surgery: Procedure(s) (LRB):  EGD (ESOPHAGOGASTRODUODENOSCOPY) (N/A) 2 Days Post-Op    Plan:     During this hospitalization, patient to be seen 6 x/week to address the identified rehab impairments via gait training, therapeutic activities, therapeutic exercises, neuromuscular re-education and progress toward the following goals:    Plan of Care Expires:  07/28/23    Subjective     Chief Complaint: pain, weakness  Patient/Family Comments/goals: "I'm not going to be able to get on a plane like this"  Pain/Comfort:  Pain Rating 1: 8/10  Location - Side 1: Left  Location - Orientation 1: upper  Location 1:  (quadrant)  Pain Addressed 1: Reposition, " Distraction, Cessation of Activity, Nurse notified  Pain Rating Post-Intervention 1: 8/10 (with standing and step taking, area of pain extends to behind the L breast region)    Patients cultural, spiritual, Baptist conflicts given the current situation: no    Living Environment:  Patient lives with dtr in [place], [place]  Prior to admission, patients level of function was Kwame with use of rollator.  Equipment used at home: rollator, cane, straight.  Upon discharge, unsure of amount of assistance that the patient will have upon discharge.    Objective:     Communicated with nurse prior to session.  Patient found HOB elevated with telemetry, peripheral IV  upon PT entry to room.    General Precautions: Standard, fall  Orthopedic Precautions:N/A   Braces: N/A  Respiratory Status: Room air    Exams:  Cognitive Exam:  Patient is oriented to Person, Place, Time, Situation, and follows multistep commands; appears somewhat sleepy/lethargic  Gross Motor Coordination:  decreased speed  Postural Exam:  Patient presented with the following abnormalities:    -       Rounded shoulders  -       Forward head  Sensation: pt endorses neuropathic pain B feet  RLE ROM: WFL except ankle df to neutral  RLE Strength: ~3+ to 4- grossly  LLE ROM: WFL except ankle df to neutral  LLE Strength: ~3+ to 4-grossly  Note: pain having bearing on strength testing    Functional Mobility:  Bed Mobility:     Supine to Sit: moderate assistance, of 2 persons, and increased time/effort to complete along with VCs for technique  Sit to Supine: maximal assistance and of 2 persons and increased time/effort to complete along with VCs for technique  Transfers:     Sit to Stand:  minimum assistance with  use of rollator  Gait: min/modA with use of rollator; slow movement, decreased step length/floor clearance and assist with RW management  Balance: sit~fair+, stand~fair, steptaking ~fair- to fair      AM-PAC 6 CLICK MOBILITY  Total Score:14       Treatment &  Education:  Pt educated in role of PT/POC; performed skills as described above; c/o dizziness upon upright; sitting /79, standing /73; pt with 8/10 pain in LUQ; required modA x 2 to sit at EOB with increased time/effort and VCs for technique; pt performed APs, FAQ, marches while seated EOB; sit to stand with Deny at rollator; side stepped to HOB x 6 small steps with min/modA toward HOB with assist for RW management; sit to supine with maxA x 2; HOB placed up as patient unable to complete transfer to supine; pt scooted toward center of bed and placed HOB max elevated.    Patient left HOB elevated with  bed in modified chair position (max HOB elevated) .    GOALS:   Multidisciplinary Problems       Physical Therapy Goals          Problem: Physical Therapy    Goal Priority Disciplines Outcome Goal Variances Interventions   Physical Therapy Goal     PT, PT/OT Ongoing, Progressing     Description: Goals to be met by: 2023     Patient will increase functional independence with mobility by performin. Supine to sit with Modified Ford  2. Sit to supine with Modified Ford  3. Rolling with Modified [place].  4. Sit to stand transfer with Modified [place]  5. Bed to chair transfer with Modified Ford using rollator  6. Gait  x 150 feet with Modified Ford using rollator  7. Lower extremity exercise program x10 reps per handout, with supervision                         History:     Past Medical History:   Diagnosis Date    Asthma     Atherosclerosis of aorta     Cancer     lung adenoCA stage IA ([id number]) dx 2012    COPD (chronic obstructive pulmonary disease)     Depression     Diabetes mellitus     Esophagitis     Hiatal hernia     Hyperlipidemia     Hypertension     Hypothyroidism, unspecified     Insomnia     Iron deficiency anemia, unspecified     Morbid obesity     Sleep apnea        Past Surgical History:   Procedure Laterality Date     ESOPHAGOGASTRODUODENOSCOPY N/A 6/26/2023    Procedure: EGD (ESOPHAGOGASTRODUODENOSCOPY);  Surgeon: Jose Mckeon MD;  Location: [facility];  Service: Endoscopy;  Laterality: N/A;    RESECTION, LUNG Left     LLL wedge resection       Time Tracking:     PT Received On: 06/28/23  PT Start Time: 1122     PT Stop Time: 1153  PT Total Time (min): 31 min with OT    Billable Minutes: Evaluation 8 and Therapeutic Activity 15      06/28/2023

## 2023-06-28 NOTE — PROGRESS NOTES
[facility]  Telemedicine Progress Note    Patient Name: Alex Lovell  MRN: [id number]  Patient Class: OP- Observation   Admission Date: 6/25/2023  Length of Stay: 0 days  Attending Physician: Kevyn Ferrari MD  Primary Care Provider: Primary Doctor No          Subjective:     Principal Problem:Pyelonephritis        HPI:  Alex Lovell is a 72 y.o. female with PMH of DMT2, HTN, hypothyroidism, lung cancer, anemia, and HLD. She presented to the emergency department today with a complaint of bilateral flank pain. Left worse than right. She reports some pain with urination, foul odor of urine, and urgency.  She denies any CP, SOB, hematuria, abd pain, N/V/D, fevers, chills, or diaphoresis. Pain is a cramping pain, worse with movement. She reports that she flew into [place] from [place] for a family reunion and started feeling bad a couple of days ago. She states the pain just continued to get worse so she decided to come in for evaluation. UA positive for UTI. Patient also noted to have H/H 8.5/29.2 on initial labs, which is lower than her baseline. Patient reports that she did notice that her stools have been darker than usual. She also admitted to experiencing dizziness, lightheadedness, and fatigue. GI contacted by ED physician and have recommended protonix BID and NPO after MN for possible intervention.       Overview/Hospital Course:  No notes on file    Interval History: no acute events. Lying in bed with no complaints. She states she hasnt been able to get out of bed for the past 2 days.     Review of Systems   Constitutional:  Negative for chills, fatigue and fever.   HENT: Negative.     Eyes: Negative.    Respiratory:  Negative for cough and shortness of breath.    Cardiovascular:  Negative for chest pain, palpitations and leg swelling.   Gastrointestinal:  Negative for abdominal pain and vomiting.   Genitourinary: Negative.    Skin: Negative.    Neurological:  Negative  for seizures and speech difficulty.   Psychiatric/Behavioral:  Negative for agitation and confusion. The patient is not nervous/anxious.    Objective:     Vital Signs (Most Recent):  Temp: 97.3 °F (36.3 °C) (06/28/23 0809)  Pulse: 95 (06/28/23 0809)  Resp: 18 (06/28/23 0809)  BP: (!) 150/80 (06/28/23 0809)  SpO2: 100 % (06/28/23 0809) Vital Signs (24h Range):  Temp:  [96.8 °F (36 °C)-98.3 °F (36.8 °C)] 97.3 °F (36.3 °C)  Pulse:  [] 95  Resp:  [14-20] 18  SpO2:  [90 %-100 %] 100 %  BP: (132-168)/(80-86) 150/80     Weight: 99.9 kg (220 lb 3.8 oz)  Body mass index is 40.28 kg/m².    Intake/Output Summary (Last 24 hours) at 6/28/2023 0931  Last data filed at 6/27/2023 2256  Gross per 24 hour   Intake 120 ml   Output 1850 ml   Net -1730 ml         Physical Exam  Vitals and nursing note reviewed.   Constitutional:       General: She is awake. She is not in acute distress.     Appearance: Normal appearance. She is well-developed and well-groomed. She is not ill-appearing, toxic-appearing or diaphoretic.   HENT:      Head: Normocephalic and atraumatic.   Eyes:      General: No scleral icterus.  Cardiovascular:      Rate and Rhythm: Normal rate.   Pulmonary:      Effort: No tachypnea or respiratory distress.   Musculoskeletal:      Right lower leg: No edema.      Left lower leg: No edema.   Skin:     Coloration: Skin is not jaundiced.   Neurological:      General: No focal deficit present.      Mental Status: She is alert and oriented to person, place, and time. Mental status is at baseline.   Psychiatric:         Attention and Perception: Attention normal.         Mood and Affect: Mood and affect normal.         Speech: Speech normal.         Behavior: Behavior normal. Behavior is cooperative.         Thought Content: Thought content normal.         Cognition and Memory: Cognition and memory normal. Cognition is not impaired. Memory is not impaired.         Judgment: Judgment normal.           Significant Labs: All  pertinent labs within the past 24 hours have been reviewed.    Significant Imaging: I have reviewed all pertinent imaging results/findings within the past 24 hours.      Assessment/Plan:      * Pyelonephritis  Temp Readings from Last 3 Encounters:   06/28/23 97.3 °F (36.3 °C) (Oral)     Lab Results   Component Value Date    WBC 9.19 06/28/2023     No results for input(s): LACTATE in the last 72 hours.    Associated with b/l flank pain L>R, malodorous urine, and urgency  UA positive for 3+ leukocytes, many bacteria, >100 WBC, and many WBC clumps  Continue rocephin    Antibiotics (From admission, onward)    Start     Stop Route Frequency Ordered    06/26/23 1900  cefTRIAXone (ROCEPHIN) 1 g in dextrose 5 % in water (D5W) 5 % 100 mL IVPB (MB+)         -- IV Every 24 hours (non-standard times) 06/25/23 2327        Cultures were taken-   Microbiology Results (last 7 days)     Procedure Component Value Units Date/Time    Blood culture (site 1) [[id number]] Collected: 06/25/23 2209    Order Status: Completed Specimen: Blood from Peripheral, Right Arm Updated: 06/28/23 0613     Blood Culture, Routine No Growth to date      No Growth to date      No Growth to date    Narrative:      Site # 1, aerobic and anaerobic    Blood culture (site 2) [[id number]] Collected: 06/25/23 2209    Order Status: Completed Specimen: Blood from Antecubital, Left Arm Updated: 06/28/23 0613     Blood Culture, Routine No Growth to date      No Growth to date      No Growth to date    Narrative:      Site # 2, aerobic only    Urine culture [[id number]]  (Abnormal) Collected: 06/25/23 1803    Order Status: Completed Specimen: Urine Updated: 06/28/23 0429     Urine Culture, Routine GRAM NEGATIVE WESLEY  > 100,000 cfu/ml  Identification and susceptibility pending  No other significant isolate      Narrative:      Specimen Source->Urine          Gastrointestinal hemorrhage associated with angiodysplasia of stomach and duodenum  Lab Results   Component Value  Date    HGB 7.7 (L) 06/28/2023    HCT 26.2 (L) 06/28/2023     GI consulted  S/p EGD: A single angioectasia in the duodenum. Treated with argon plasma coagulation (APC).  protonix twice a day for 2 months    Hypothyroidism, unspecified  · Continue synthroid      Hyperlipidemia  · Continue statin      Hypertension  · Home BP meds o/h given possible GIB  · Will resume when cleared by GI    - 6/26   - we will resume home BP meds      Depression  Patient has persistent depression which is unknown and is currently controlled. Will Continue anti-depressant medications. We will not consult psychiatry at this time. Patient does not display psychosis at this time. Continue to monitor closely and adjust plan of care as needed.        DM (diabetes mellitus)  Patient's FSGs are controlled on current medication regimen.  Last A1c reviewed-   Lab Results   Component Value Date    HGBA1C 7.7 (H) 06/27/2023     Most recent glucose reviewed- 195  Current correctional scale  Medium  Maintain anti-hyperglycemic dose as follows-   Antihyperglycemics (From admission, onward)    Start     Stop Route Frequency Ordered    06/26/23 0027  insulin aspart U-100 pen 1-10 Units         -- SubQ Every 6 hours PRN 06/25/23 2327        Hold Oral hypoglycemics while patient is in the hospital.          Melena  GI following  - patient is on iron and Eliquis at home  - will check FOBT- not collected       Anemia  · H/H 8.5/29.2  · Baseline H/H 1/2022 12.1/39.2  · Suspected 2/2 GIB in the setting of new onset dark stools  · Occult stool pending  · GI consulted  · Recommendation to give protonix 40 mg BID and keep NPO after MN for evaluation  · Maintain telemetry  · Transfuse for Hgb <7  · Continue PO iron      6/26  - hemoglobin stable 8.2  - GI consult, EGD today, recommendations     - large hiatal hernia, with large amount of food in the stomach     - single angioectasia in the duodenum treated with APC     - advance diet as tolerated     - continue  present medications     - Protonix twice a day for 2 months     -  hold Eliquis for another 3 days then okay to resume     - repeat UGI in 2 months to assess rest of, recommend liquids 24 hours prior to procedure                 VTE Risk Mitigation (From admission, onward)         Ordered     IP VTE HIGH RISK PATIENT  Once         06/25/23 2155     Place sequential compression device  Until discontinued         06/25/23 2155     Reason for No Pharmacological VTE Prophylaxis  Once        Question:  Reasons:  Answer:  Active Bleeding    06/25/23 2155                      I have completed this tele-visit without the assistance of a telepresenter.    The attending portion of this evaluation, treatment, and documentation was performed per Kevyn Mathis MD via Telemedicine AudioVisual using the secure [employer] software platform with 2 way audio/video. The provider was located off-site and the patient is located in the hospital. The aforementioned video software was utilized to document the relevant history and physical exam    Kevyn Mathis MD  Department of Hospital Medicine   [facility]

## 2023-06-28 NOTE — PLAN OF CARE
Pt would benefit from cont OT services in order to maximize functional independence. Recommending moderate intensity therapy upon d/c. Pt is significantly weaker than reported baseline at this time. Pt with repots of dizziness EOB & OOB. /79 EOB & 125/73 after standing. Pt requires Mod/MaxA x2 for sup<>sit. Pt requires ModA & rollator for side steps to HOB. Will progress as able.     Problem: Occupational Therapy  Goal: Occupational Therapy Goal  Description: Goals to be met by: 7/28/2023     Patient will increase functional independence with ADLs by performing:    UE Dressing with Set-up Assistance.  LE Dressing with Set-up Assistance.  Grooming while standing with Set-up Assistance.  Toileting from toilet with Supervision for hygiene and clothing management.   Supine to sit with Modified [place].  Step transfer with Supervision & appropriate AD.  Toilet transfer to toilet with Supervision & appropriate AD.    Outcome: Ongoing, Progressing

## 2023-06-28 NOTE — PLAN OF CARE
Problem: Physical Therapy  Goal: Physical Therapy Goal  Description: Goals to be met by: 2023     Patient will increase functional independence with mobility by performin. Supine to sit with Modified [place]  2. Sit to supine with Modified [place]  3. Rolling with Modified [place].  4. Sit to stand transfer with Modified [place]  5. Bed to chair transfer with Modified [place] using rollator  6. Gait  x 150 feet with Modified [place] using rollator  7. Lower extremity exercise program x10 reps per handout, with supervision    Outcome: Ongoing, Progressing   PT/OT evaluated the patient this AM; recommending moderate intensity therapy; c/o dizziness upon upright; sitting /79, standing /73; pt with 8/10 pain in LUQ; required modA x 2 to sit at EOB with increased time/effort and VCs for technique; sit to stand with Deny at rollator; side stepped to HOB x 6 small steps with min/modA toward HOB with assist for RW management; sit to supine with maxA x 2; per pt report, she has not been OOB in 3 days; will benefit from continued acute PT services to maximize functional independence; will progress as able.

## 2023-06-28 NOTE — ASSESSMENT & PLAN NOTE
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 Treated with argon plasma coagulation (APC).  protonix twice a day for 2 months

## 2023-06-28 NOTE — PLAN OF CARE
met with patient. Per nurse, patient did not have anywhere to go. I contacted patient's daughter via phone. She will make arrangements when patient is ready to discharge. She just wants to get an idea when she would be leaving because she is trying to get a flight right back home for her mother. Patient has family here who can bring her to airport. I updated daughter MD team was waiting on cultures. However, patient is in OBS status and possibly could be ready to leave by today.  updated OBS Supervisor Sivan as well. Daughter encouraged to call with any questions or concerns.  will continue to follow patient through transitions of care and assist with any discharge needs.     1400-- spoke to therapy regarding their recommendations. They stated patient limited due to pain as well. Patient is Medicare Obs status. She cannot be placed in SNF facility. Her other options would be IPR (if rehab diagnosis and meets criteria) or Home Health while here (however limited due to no PCP here).  contacted patient's daughter and read her Therapy notes. Daughter does not want her mother staying here for any type of therapy. She requested her mother have as much assistance out of bed while here. She tells me her mother uses a rollator and has wheelchair service for the plane. She is booking a flight for tomorrow night for her mother and will update  once booked. Plan is for patient to go back to [place] at discharge.     Daughter contacted  again and stated she has her mother a flight booked for tomorrow. She will need to be at airport before 5 pm. She will have a family member stay with her at airport so she is not alone. I again mentioned to daughter OBS status and patient would be ready possibly by today. She tells me patient would have no where to go and asking she stay the night. I informed MD team. Dr. Ferrari stated still waiting on  cultures. Nursing staff also updated and asked that they work with patient to ensure she can get out of bed, etc.     Patient Contacts    Name Relation Home Work Mobile   Cristy Lovell Daughter   [phone]      06/28/23 2869   Discharge Reassessment   Assessment Type Discharge Planning Reassessment   Did the patient's condition or plan change since previous assessment? No   Discharge Plan discussed with: Adult children;Patient   Discharge Plan A Home with family   Discharge Plan B   (Rehab? TBD?)   DME Needed Upon Discharge  other (see comments)  (TBD)   Transition of Care Barriers Other (see comments)  (Patient lives in [place].)   Why the patient remains in the hospital Requires continued medical care   Post-Acute Status   Discharge Delays (!) Personal Transportation     Kathy Ching RN    [phone]

## 2023-06-28 NOTE — PT/OT/SLP EVAL
Occupational Therapy   Evaluation    Name: Alex Lovell  MRN: [id number]  Admitting Diagnosis: Pyelonephritis  Recent Surgery: Procedure(s) (LRB):  EGD (ESOPHAGOGASTRODUODENOSCOPY) (N/A) 2 Days Post-Op    Recommendations:     Discharge Recommendations: other (see comments) (moderate intensity therapy)  Discharge Equipment Recommendations:  other (see comments) (TBD)  Barriers to discharge:  Other (Comment), Inaccessible home environment (Pt requires increased level of assistance)    Assessment:     Alex Lovell is a 72 y.o. female with a medical diagnosis of Pyelonephritis.  She presents with The primary encounter diagnosis was Anemia, unspecified type. Diagnoses of Pyelonephritis, Chest pain, Melena, and Acute upper GI bleed were also pertinent to this visit. Performance deficits affecting function: weakness, impaired functional mobility, gait instability, impaired endurance, pain, decreased coordination, decreased upper extremity function, decreased ROM, impaired self care skills, decreased lower extremity function, impaired balance, impaired cardiopulmonary response to activity, decreased safety awareness.      Pt would benefit from cont OT services in order to maximize functional independence. Recommending moderate intensity therapy upon d/c. Pt is significantly weaker than reported baseline at this time. Pt with repots of dizziness EOB & OOB. /79 EOB & 125/73 after standing. Pt requires Mod/MaxA x2 for sup<>sit. Pt requires ModA & rollator for side steps to HOB. Will progress as able.     Rehab Prognosis: Good; patient would benefit from acute skilled OT services to address these deficits and reach maximum level of function.       Plan:     Patient to be seen 5 x/week to address the above listed problems via self-care/home management, therapeutic activities, therapeutic exercises  Plan of Care Expires: 07/28/23  Plan of Care Reviewed with: patient    Subjective     Chief Complaint: Pain  "LUQ    Patient/Family Comments/goals: "I cannot go to the airport from here."    Occupational Profile:  Living Environment: Pt lives w/daughter in [place], [place].   Previous level of function: [place] with use of rollator  Equipment Used at Home: rollator, cane, straight  Assistance upon Discharge: Daughter    Pain/Comfort:  Pain Rating 1: 8/10  Location - Side 1: Left  Location - Orientation 1: upper  Location 1: other (see comments) (quadrant)  Pain Addressed 1: Reposition, Cessation of Activity, Distraction, Nurse notified  Pain Rating Post-Intervention 1: 8/10 (with standing and step taking, area of pain extends to behind the L breast region)    Patients cultural, spiritual, Mu-ism conflicts given the current situation: no    Objective:     Communicated with: nsg prior to session.  Patient found HOB elevated with telemetry, peripheral IV upon OT entry to room.    General Precautions: Standard, fall  Orthopedic Precautions: N/A  Braces: N/A  Respiratory Status: Room air    Occupational Performance:    Bed Mobility:    Patient completed Supine to Sit with moderate assistance and 2 persons  Patient completed Sit to Supine with maximal assistance and 2 persons    Functional Mobility/Transfers:  Patient completed Sit <> Stand Transfer with minimum assistance  with  rollator   Functional Mobility: Pt performing side steps to HOB with ModA & use of rollator, pt with increased c/o pain & dizziness. Returned to sitting 2/2 safety & pt request.    Cognitive/Visual Perceptual:  Cognitive/Psychosocial Skills:     -       Oriented to: Person, Place, and Situation   -       Memory: No Deficits noted  -       Mood/Affect/Coping skills/emotional control: Cooperative    Physical Exam:  Limited testing 2/2 pt with c/o increased pain with AROM & MMT    AMPAC 6 Click ADL:  AMPAC Total Score: 16    Treatment & Education:  Pt would benefit from cont OT services in order to maximize functional independence.   Pt is significantly " weaker than reported baseline at this time.   Pt with repots of dizziness EOB & OOB. /79 EOB & 125/73 after standing.   Pt requires Mod/MaxA x2 for sup<>sit.   Pt requires ModA & rollator for side steps to HOB.   Will progress as able.     Patient left HOB elevated with all lines intact, call button in reach, bed alarm on, and nsg notified    GOALS:   Multidisciplinary Problems       Occupational Therapy Goals          Problem: Occupational Therapy    Goal Priority Disciplines Outcome Interventions   Occupational Therapy Goal     OT, PT/OT Ongoing, Progressing    Description: Goals to be met by: 7/28/2023     Patient will increase functional independence with ADLs by performing:    UE Dressing with Set-up Assistance.  LE Dressing with Set-up Assistance.  Grooming while standing with Set-up Assistance.  Toileting from toilet with Supervision for hygiene and clothing management.   Supine to sit with Modified [place].  Step transfer with Supervision & appropriate AD.  Toilet transfer to toilet with Supervision & appropriate AD.                         History:     Past Medical History:   Diagnosis Date    Asthma     Atherosclerosis of aorta     Cancer     lung adenoCA stage IA ([id number]) dx 7/2012    COPD (chronic obstructive pulmonary disease)     Depression     Diabetes mellitus     Esophagitis     Hiatal hernia     Hyperlipidemia     Hypertension     Hypothyroidism, unspecified     Insomnia     Iron deficiency anemia, unspecified     Morbid obesity     Sleep apnea          Past Surgical History:   Procedure Laterality Date    ESOPHAGOGASTRODUODENOSCOPY N/A 6/26/2023    Procedure: EGD (ESOPHAGOGASTRODUODENOSCOPY);  Surgeon: Jose Mckeon MD;  Location: [facility];  Service: Endoscopy;  Laterality: N/A;    RESECTION, LUNG Left     LLL wedge resection       Time Tracking:     OT Date of Treatment: 06/28/23  OT Start Time: 1122  OT Stop Time: 1153  OT Total Time (min): 31 min with PT    Billable  Minutes:Evaluation 8  Therapeutic Activity 15    6/28/2023

## 2023-06-28 NOTE — PLAN OF CARE
Problem: Adult Inpatient Plan of Care  Goal: Plan of Care Review  Outcome: Ongoing, Progressing  Goal: Absence of Hospital-Acquired Illness or Injury  Outcome: Ongoing, Progressing  Intervention: Identify and Manage Fall Risk  Flowsheets (Taken 6/27/2023 2211)  Safety Promotion/Fall Prevention:   assistive device/personal item within reach   bed alarm set   side rails raised x 2   instructed to call staff for mobility   medications reviewed   lighting adjusted  Goal: Optimal Comfort and Wellbeing  Outcome: Ongoing, Progressing  Intervention: Provide Person-Centered Care  Flowsheets (Taken 6/27/2023 2211)  Trust Relationship/Rapport:   care explained   choices provided   reassurance provided   emotional support provided   empathic listening provided   questions answered   questions encouraged   thoughts/feelings acknowledged     Problem: Diabetes Comorbidity  Goal: Blood Glucose Level Within Targeted Range  Outcome: Ongoing, Progressing  Intervention: Monitor and Manage Glycemia  Flowsheets (Taken 6/27/2023 2211)  Glycemic Management: blood glucose monitored

## 2023-06-28 NOTE — ASSESSMENT & PLAN NOTE
Temp Readings from Last 3 Encounters:   06/28/23 97.3 °F (36.3 °C) (Oral)     Lab Results   Component Value Date    WBC 9.19 06/28/2023     No results for input(s): LACTATE in the last 72 hours.    Associated with b/l flank pain L>R, malodorous urine, and urgency  UA positive for 3+ leukocytes, many bacteria, >100 WBC, and many WBC clumps  Continue rocephin    Antibiotics (From admission, onward)    Start     Stop Route Frequency Ordered    06/26/23 1900  cefTRIAXone (ROCEPHIN) 1 g in dextrose 5 % in water (D5W) 5 % 100 mL IVPB (MB+)         -- IV Every 24 hours (non-standard times) 06/25/23 2327        Cultures were taken-   Microbiology Results (last 7 days)     Procedure Component Value Units Date/Time    Blood culture (site 1) [[id number]] Collected: 06/25/23 2209    Order Status: Completed Specimen: Blood from Peripheral, Right Arm Updated: 06/28/23 0613     Blood Culture, Routine No Growth to date      No Growth to date      No Growth to date    Narrative:      Site # 1, aerobic and anaerobic    Blood culture (site 2) [[id number]] Collected: 06/25/23 2209    Order Status: Completed Specimen: Blood from Antecubital, Left Arm Updated: 06/28/23 0613     Blood Culture, Routine No Growth to date      No Growth to date      No Growth to date    Narrative:      Site # 2, aerobic only    Urine culture [[id number]]  (Abnormal) Collected: 06/25/23 1803    Order Status: Completed Specimen: Urine Updated: 06/28/23 0429     Urine Culture, Routine GRAM NEGATIVE [place]ES[place]  > 100,000 cfu/ml  Identification and susceptibility pending  No other significant isolate      Narrative:      Specimen Source->Urine

## 2023-06-29 VITALS
BODY MASS INDEX: 40.53 KG/M2 | HEIGHT: 62 IN | SYSTOLIC BLOOD PRESSURE: 150 MMHG | HEART RATE: 98 BPM | DIASTOLIC BLOOD PRESSURE: 84 MMHG | TEMPERATURE: 97 F | WEIGHT: 220.25 LBS | RESPIRATION RATE: 18 BRPM | OXYGEN SATURATION: 97 %

## 2023-06-29 LAB
BACTERIA UR CULT: ABNORMAL
POCT GLUCOSE: 135 MG/DL (ref 70–110)
POCT GLUCOSE: 138 MG/DL (ref 70–110)

## 2023-06-29 PROCEDURE — 97535 SELF CARE MNGMENT TRAINING: CPT

## 2023-06-29 PROCEDURE — 97530 THERAPEUTIC ACTIVITIES: CPT

## 2023-06-29 PROCEDURE — 25000003 PHARM REV CODE 250: Performed by: NURSE PRACTITIONER

## 2023-06-29 PROCEDURE — 25000003 PHARM REV CODE 250: Performed by: HOSPITALIST

## 2023-06-29 PROCEDURE — 94640 AIRWAY INHALATION TREATMENT: CPT

## 2023-06-29 PROCEDURE — 94761 N-INVAS EAR/PLS OXIMETRY MLT: CPT

## 2023-06-29 PROCEDURE — 97535 SELF CARE MNGMENT TRAINING: CPT | Mod: CO

## 2023-06-29 PROCEDURE — 99900035 HC TECH TIME PER 15 MIN (STAT)

## 2023-06-29 PROCEDURE — 97530 THERAPEUTIC ACTIVITIES: CPT | Mod: CO

## 2023-06-29 RX ORDER — SULFAMETHOXAZOLE AND TRIMETHOPRIM 800; 160 MG/1; MG/1
1 TABLET ORAL 2 TIMES DAILY
Qty: 6 TABLET | Refills: 0 | Status: SHIPPED | OUTPATIENT
Start: 2023-06-29 | End: 2023-07-02

## 2023-06-29 RX ADMIN — PANTOPRAZOLE SODIUM 40 MG: 40 TABLET, DELAYED RELEASE ORAL at 08:06

## 2023-06-29 RX ADMIN — FLUTICASONE FUROATE AND VILANTEROL TRIFENATATE 1 PUFF: 100; 25 POWDER RESPIRATORY (INHALATION) at 07:06

## 2023-06-29 RX ADMIN — ATORVASTATIN CALCIUM 40 MG: 40 TABLET, FILM COATED ORAL at 08:06

## 2023-06-29 RX ADMIN — DOCUSATE SODIUM AND SENNOSIDES 1 TABLET: 8.6; 5 TABLET, FILM COATED ORAL at 08:06

## 2023-06-29 RX ADMIN — DULOXETINE 30 MG: 30 CAPSULE, DELAYED RELEASE ORAL at 08:06

## 2023-06-29 RX ADMIN — POLYETHYLENE GLYCOL 3350 17 G: 17 POWDER, FOR SOLUTION ORAL at 08:06

## 2023-06-29 RX ADMIN — HYDROCODONE BITARTRATE AND ACETAMINOPHEN 1 TABLET: 10; 325 TABLET ORAL at 05:06

## 2023-06-29 RX ADMIN — LEVOTHYROXINE SODIUM 112 MCG: 112 TABLET ORAL at 05:06

## 2023-06-29 RX ADMIN — GABAPENTIN 300 MG: 300 CAPSULE ORAL at 08:06

## 2023-06-29 NOTE — PT/OT/SLP PROGRESS
Physical Therapy Treatment    Patient Name:  Alex Lovell   MRN:  [id number]    Recommendations:     Discharge Recommendations:  (moderate intensity therapy)  Discharge Equipment Recommendations:  (TBD)  Barriers to discharge:  increased level of assistance    Assessment:     Alex Lovell is a 72 y.o. female admitted with a medical diagnosis of Pyelonephritis.  She presents with the following impairments/functional limitations: weakness, impaired endurance, impaired self care skills, impaired functional mobility, gait instability, impaired balance, decreased upper extremity function, decreased lower extremity function, pain, impaired coordination, impaired skin, edema, impaired cardiopulmonary response to activity Patient with improved tolerance to activity; still with decreasing BP during session with prolonged sitting and reports of mild dizziness; frequent rest breaks needed 2/2 fatigue and weakness; will cont with POC.    Rehab Prognosis: Good; patient would benefit from acute skilled PT services to address these deficits and reach maximum level of function.    Recent Surgery: Procedure(s) (LRB):  EGD (ESOPHAGOGASTRODUODENOSCOPY) (N/A) 3 Days Post-Op    Plan:     During this hospitalization, patient to be seen 6 x/week to address the identified rehab impairments via gait training, therapeutic activities, therapeutic exercises, neuromuscular re-education and progress toward the following goals:    Plan of Care Expires:  07/28/23    Subjective     Chief Complaint: weakness, mild dizziness  Patient/Family Comments/goals: to go back to [place]  Pain/Comfort:  Pain Rating 1:  (hernia pain when transitioning- did not rate)  Location - Side 1: Left  Location - Orientation 1: upper  Location 1: flank  Pain Addressed 1: Reposition, Distraction, Nurse notified      Objective:     Communicated with nurse prior to session.  Patient found HOB elevated with peripheral IV, telemetry, bed alarm, PureWick upon PT entry to room.      General Precautions: Standard, fall  Orthopedic Precautions: N/A  Braces: N/A  Respiratory Status: Room air     Functional Mobility:  Bed Mobility:     Rolling Right: moderate assistance and use of side rail  Scooting: stand by assistance to scoot to EOB  Supine to Sit: moderate assistance and use of side rail, HOB elevated, increased time/effort, VCs for effective technique ; pt declined attempting sup>sit form flat bed secondary to abdominal pain; pt reports she will be sleeping in recliner at home until she is able to move better.   Sit to Supine: minimum assistance, moderate assistance, for BLE assist  Transfers:     Sit to Stand:  contact guard assistance with  rollator and VCs for hand placement, multiple times from EOB, WC and BSC  Bed to Chair: contact guard assistance with   rollator   using  Step Transfer  Toilet Transfer: contact guard assistance with   rollator   using  Step Transfer and BSC  Gait: patient amb ~6ft using Rollator with CGA; L knee with mild buckling which pt reports is a longstanding problem; slow judah, and fatigues quickly    AM-PAC 6 CLICK MOBILITY  Turning over in bed (including adjusting bedclothes, sheets and blankets)?: 2  Sitting down on and standing up from a chair with arms (e.g., wheelchair, bedside commode, etc.): 3  Moving from lying on back to sitting on the side of the bed?: 2  Moving to and from a bed to a chair (including a wheelchair)?: 3  Need to walk in hospital room?: 3  Climbing 3-5 steps with a railing?: 1  Basic Mobility Total Score: 14       Treatment & Education:     Patient progressing toward goals with increased tolerance to PT/OT; continues with 7/10 L flank pain; sup to sit at EOB with modA, increased time and use of side rail; pt sit to stand x 2 trials with CGA and VCs for hand placement and locking rollator in place; on 2nd trial, pt amb ~6ft to the wheelchair to sit with CGA; L knee with mild buckling which pt reports is a longstanding problem;  wheeled pt to side of bed then transferred to bedside commode with CGA for BM; pt stood to assist with cleansing then took steps back to wheelchair; instructed in BLE/UE ex to be done while sitting in chair; left pt sit up then returned to transfer back to bed; pt reported mild dizziness initially when first getting to EOB then throughout session; vitals taken: 166/82 HR 97 sit, 137/87  stand-BP continued to decrease with prolonged sitting 121/70  by end of session in sitting with mild dizziness; pt also required frequent rest breaks 2/2 weakness/fatigue; will cont with POC.    Patient left HOB elevated with all lines intact, call button in reach, bed alarm on, and nurse notified..    GOALS:   Multidisciplinary Problems       Physical Therapy Goals          Problem: Physical Therapy    Goal Priority Disciplines Outcome Goal Variances Interventions   Physical Therapy Goal     PT, PT/OT Ongoing, Progressing     Description: Goals to be met by: 2023     Patient will increase functional independence with mobility by performin. Supine to sit with Modified [place]  2. Sit to supine with Modified [place]  3. Rolling with Modified [place].  4. Sit to stand transfer with Modified [place]  5. Bed to chair transfer with Modified [place] using rollator  6. Gait  x 150 feet with Modified [place] using rollator  7. Lower extremity exercise program x10 reps per handout, with supervision                         Time Tracking:     PT Received On: 23  PT Start Time: 1130 (0958)     PT Stop Time: 1138 (1043)  PT Total Time (min): 8 min + 45 min    Billable Minutes: Therapeutic Activity 45  Self Care 8       PT/PTA: PT     Number of PTA visits since last PT visit: 0     2023

## 2023-06-29 NOTE — PLAN OF CARE
Problem: Adult Inpatient Plan of Care  Goal: Optimal Comfort and Wellbeing  Outcome: Ongoing, Progressing  Intervention: Monitor Pain and Promote Comfort  Flowsheets (Taken 6/28/2023 2104)  Pain Management Interventions:   medication offered   care clustered   relaxation techniques promoted   quiet environment facilitated   pain management plan reviewed with patient/caregiver  Intervention: Provide Person-Centered Care  Flowsheets (Taken 6/28/2023 2104)  Trust Relationship/Rapport:   care explained   reassurance provided   choices provided   emotional support provided   empathic listening provided   questions answered   questions encouraged   thoughts/feelings acknowledged

## 2023-06-29 NOTE — PLAN OF CARE
Problem: Occupational Therapy  Goal: Occupational Therapy Goal  Description: Goals to be met by: 7/28/2023     Patient will increase functional independence with ADLs by performing:    UE Dressing with Set-up Assistance.  LE Dressing with Set-up Assistance.  Grooming while standing with Set-up Assistance.  Toileting from toilet with Supervision for hygiene and clothing management.   Supine to sit with Modified [place].  Step transfer with Supervision & appropriate AD.  Toilet transfer to toilet with Supervision & appropriate AD.    6/29/2023 1258 by MELONIE Cuevas/L  Outcome: Ongoing, Progressing  6/29/2023 1049 by MELONIE Cuevas/L  Outcome: Ongoing, Progressing   Alex Lovell is a 72 y.o. female with a medical diagnosis of Pyelonephritis.  Performance deficits affecting function are weakness, impaired endurance, impaired self care skills, impaired functional mobility, gait instability, impaired balance, decreased upper extremity function, decreased lower extremity function, pain, impaired coordination, impaired skin, edema, impaired cardiopulmonary response to activity.    Pt with improved performance and tolerance of therapy.  She required Mod A for bed mobility and transferred to  with CGA using Rollator.  Pt with reports of mild dizziness when transitioning supine>sit>stand with decrease in BP from 166/82 HR 97 sit, 137/87  stand. BP continued to decrease with prolonged sitting 121/70  by end of session in sitting with mild dizziness.  Pt also required frequent rest breaks 2/2 weakness/fatigue.  Continue OT services to address functional goals, progressing as able.

## 2023-06-29 NOTE — PLAN OF CARE
Called the daughter, Cristy Lovell, and reviewed AVS and discharge instructions. SHe verbalized understanding.

## 2023-06-29 NOTE — PROGRESS NOTES
[facility]                    Pharmacy       Discharge Medication Education    Patient ACCEPTED medication education. Pharmacy has provided education on the name, indication, and possible side effects of the medication(s) prescribed, using teach-back method.     The following medications have also been discussed, during this admission.        Medication List        START taking these medications      pantoprazole 40 MG tablet  Commonly known as: PROTONIX  Take 1 tablet (40 mg total) by mouth 2 (two) times daily.            CHANGE how you take these medications      ELIQUIS 5 mg Tab  Generic drug: apixaban  Take 1 tablet (5 mg total) by mouth 2 (two) times daily. HOLD UNTIL 6/29/23  What changed: additional instructions            CONTINUE taking these medications      amLODIPine 5 MG tablet  Commonly known as: NORVASC     atorvastatin 40 MG tablet  Commonly known as: LIPITOR     carvediloL 3.125 MG tablet  Commonly known as: COREG     DULoxetine 30 MG capsule  Commonly known as: CYMBALTA     ferrous sulfate 325 mg (65 mg iron) Tab tablet  Commonly known as: FEOSOL     gabapentin 300 MG capsule  Commonly known as: NEURONTIN     JARDIANCE 25 mg tablet  Generic drug: empagliflozin     levothyroxine 112 MCG tablet  Commonly known as: SYNTHROID     losartan 50 MG tablet  Commonly known as: COZAAR     metFORMIN 500 MG tablet  Commonly known as: GLUCOPHAGE     sulfamethoxazole-trimethoprim 800-160mg 800-160 mg Tab  Commonly known as: BACTRIM DS  Take 1 tablet by mouth 2 (two) times daily. for 3 days     SYMBICORT 160-4.5 mcg/actuation Hfaa  Generic drug: budesonide-formoterol 160-4.5 mcg     VENTOLIN HFA 90 mcg/actuation inhaler  Generic drug: albuterol     VICTOZA 3-ANTONIO 0.6 mg/0.1 mL (18 mg/3 mL) Pnij pen  Generic drug: liraglutide 0.6 mg/0.1 mL (18 mg/3 mL) subq PNIJ            STOP taking these medications      omeprazole 20 MG capsule  Commonly known as: PRILOSEC               Where to Get Your  Medications        These medications were sent to [facility]  [address], [place] [address]      Hours: Mon-Fri, 8a-5:30p Phone: [phone]   ELIQUIS 5 mg Tab  pantoprazole 40 MG tablet  sulfamethoxazole-trimethoprim 800-160mg 800-160 mg Tab          Thank you  Shawn Caban, PharmD

## 2023-06-29 NOTE — PLAN OF CARE
Problem: Occupational Therapy  Goal: Occupational Therapy Goal  Description: Goals to be met by: 7/28/2023     Patient will increase functional independence with ADLs by performing:    UE Dressing with Set-up Assistance.  LE Dressing with Set-up Assistance.  Grooming while standing with Set-up Assistance.  Toileting from toilet with Supervision for hygiene and clothing management.   Supine to sit with Modified [place].  Step transfer with Supervision & appropriate AD.  Toilet transfer to toilet with Supervision & appropriate AD.    Outcome: Ongoing, Progressing      Pt with improved performance and tolerance of therapy.  She required Mod A for bed mobility and transferred to  with CGA using Rollator.  Pt with reports of mild dizziness when transitioning supine>sit>stand with decrease in BP from 166/82 HR 97 sit, 137/87  stand. BP continued to decrease with prolonged sitting 121/70  by end of session in sitting with mild dizziness.  Pt also required frequent rest breaks 2/2 weakness/fatigue.  Continue OT services to address functional goals, progressing as able.

## 2023-06-29 NOTE — PT/OT/SLP PROGRESS
Occupational Therapy   Treatment    Name: Alex Lovell  MRN: [id number]  Admitting Diagnosis:  Pyelonephritis  3 Days Post-Op    Recommendations:     Discharge Recommendations: other (see comments) (moderate intensity therapy)  Discharge Equipment Recommendations:  other (see comments) (TBD)  Barriers to discharge:  Inaccessible home environment, Other (Comment) (Increased level of assistance)    Assessment:     Alex Lovell is a 72 y.o. female with a medical diagnosis of Pyelonephritis.  Performance deficits affecting function are weakness, impaired endurance, impaired self care skills, impaired functional mobility, gait instability, impaired balance, decreased upper extremity function, decreased lower extremity function, pain, impaired coordination, impaired skin, edema, impaired cardiopulmonary response to activity.    Pt with improved performance and tolerance of therapy.  She required Mod A for bed mobility and transferred to  with CGA using Rollator.  Pt with reports of mild dizziness when transitioning supine>sit>stand with decrease in BP from 166/82 HR 97 sit, 137/87  stand. BP continued to decrease with prolonged sitting 121/70  by end of session in sitting with mild dizziness.  Pt also required frequent rest breaks 2/2 weakness/fatigue.  Continue OT services to address functional goals, progressing as able.      Rehab Prognosis:  Good; patient would benefit from acute skilled OT services to address these deficits and reach maximum level of function.       Plan:     Patient to be seen 5 x/week to address the above listed problems via self-care/home management, therapeutic activities, therapeutic exercises  Plan of Care Expires: 07/28/23  Plan of Care Reviewed with: patient    Subjective     Chief Complaint: weakness  Patient/Family Comments/goals: to go back to [place]  Pain/Comfort:  Pain Rating 1:  (hernia pain when transitioning-did not rate)    Objective:     Communicated with: RN prior to  session.  Patient found HOB elevated with peripheral IV, telemetry, bed alarm, PureWick upon OT entry to room.    General Precautions: Standard, fall    Orthopedic Precautions:N/A  Braces: N/A  Respiratory Status: Room air     Occupational Performance:     Bed Mobility:    Patient completed Rolling/Turning to Right with moderate assistance and with side rail  Patient completed Scooting/Bridging with stand by assistance to scoot seated to EOB  Patient completed Supine to Sit with moderate assistance, with side rail, and HOB elevated, increased time and effort, vc's for effective technique. Pt declined attempting sup>sit form flat bed secondary to abdominal pain.  Pt reports she will be sleeping in recliner at home until she is able to move better.   Patient completed Sit to Supine with minimum and moderate assistance for BLE assist.    Functional Mobility/Transfers:  Patient completed Sit <> Stand Transfer with contact guard assistance  with Rollator and vcs for hand placement, multiple times from EOB,  and BSC.  Patient completed Bed <> Chair Transfer using Stand Pivot technique with contact guard assistance with rollator  Patient completed Toilet Transfer Stand Pivot technique with contact guard assistance with  rolling walker and bedside commode  Functional Mobility: Pt ambulated with CGA using Rollator ~6 feet.  Pt with L knee controlled buckling.  Slow pace. Easily fatigues.     Activities of Daily Living:  Upper Body Dressing: moderate assistance don gown behind back seated EOB  Toileting: minimum assistance for thorough cleaning after BM.  Pt with black stool-RN notified.        [facility] 6 Click ADL: 16    Treatment & Education:  Pt sat EOB with SBA.  Mild dizziness and SOB while EOB. BP dropped.  See assessment for details.   Pt instructed on Pursed Lip Breathing Technique requiring min verbal cues to perform efficiently.     Pt instructed on importance of ankle pumps and to perform on airplane as well to  reduce the risk of blood clots.      2nd session, assisted pt back to bed. Pt tolerated OOB ~1 hour. Purewick replaced.       Patient left HOB elevated with all lines intact, call button in reach, bed alarm on, and RN notified    GOALS:   Multidisciplinary Problems       Occupational Therapy Goals          Problem: Occupational Therapy    Goal Priority Disciplines Outcome Interventions   Occupational Therapy Goal     OT, PT/OT Ongoing, Progressing    Description: Goals to be met by: 7/28/2023     Patient will increase functional independence with ADLs by performing:    UE Dressing with Set-up Assistance.  LE Dressing with Set-up Assistance.  Grooming while standing with Set-up Assistance.  Toileting from toilet with Supervision for hygiene and clothing management.   Supine to sit with Modified [place].  Step transfer with Supervision & appropriate AD.  Toilet transfer to toilet with Supervision & appropriate AD.                         Time Tracking:     OT Date of Treatment: 06/29/23  OT Start Time: 0958 (1100)  OT Stop Time: 1043 (1138)  OT Total Time (min): 45 min/8 min     Billable Minutes:Self Care/Home Management 15  Therapeutic Activity 30 /8            6/29/2023

## 2023-06-29 NOTE — PLAN OF CARE
Problem: Physical Therapy  Goal: Physical Therapy Goal  Description: Goals to be met by: 2023     Patient will increase functional independence with mobility by performin. Supine to sit with Modified [place]  2. Sit to supine with Modified [place]  3. Rolling with Modified [place].  4. Sit to stand transfer with Modified [place]  5. Bed to chair transfer with Modified [place] using rollator  6. Gait  x 150 feet with Modified [place] using rollator  7. Lower extremity exercise program x10 reps per handout, with supervision    Outcome: Ongoing, Progressing     Patient progressing toward goals with increased tolerance to PT/OT; continues with 7/10 L flank pain; sup to sit at EOB with modA, increased time and use of side rail; pt sit to stand x 2 trials with CGA and VCs for hand placement and locking rollator in place; on 2nd trial, pt amb ~6ft to the wheelchair to sit with CGA; L knee with mild buckling which pt reports is a longstanding problem; wheeled pt to side of bed then transferred to bedside commode with CGA for BM; pt stood to assist with cleansing then took steps back to wheelchair; instructed in BLE/UE ex to be done while sitting in chair; left pt sit up then returned to transfer back to bed; pt reported mild dizziness initially when first getting to EOB then throughout session; vitals taken: 166/82 HR 97 sit, 137/87  stand-BP continued to decrease with prolonged sitting 121/70  by end of session in sitting with mild dizziness; pt also required frequent rest breaks 2/2 weakness/fatigue; will cont with POC.

## 2023-06-29 NOTE — PLAN OF CARE
Discharge orders noted. No DME or Home Health ordered. I spoke with daughter yesterday and she is refusing any type of placement. Daughter has booked her mother a flight for this afternoon.  will touch base with daughter to see when family will be here to pick patient up. Patient to follow-up with PCP in [place]. No further Case Management needs.    0913-- contacted daughter and confirmed family will be here for 3 pm. I told her patient had mentioned to nursing staff as well. They will have everything ready when family comes to transport. I also mentioned to staff meds sent for bedside delivery. Daughter knows she can call pharmacy to transfer any scripts if needed. All questions answered. Daughter thanked  for all her assistance.     Patient Contacts    Name Relation Home Work Mobile   Cristy Lovell Daughter   [phone]     Follow up with a Primary Care MD Follow up with a Primary Care MD     [email or web address]      Next Steps: Call today  Appointment:   Instructions: For your return home to [place], please visit this website to find and schedule a provider covered with your plan. 1-800-MEDICARE ([phone])      06/29/23 0821   Final Note   Assessment Type Final Discharge Note   Anticipated Discharge Disposition Home   Hospital Resources/Appts/Education Provided Appointments scheduled by Navigator/Coordinator   Post-Acute Status   Discharge Delays None known at this time     Kathy Ching RN    [phone]

## 2023-06-29 NOTE — DISCHARGE SUMMARY
[facility] Medicine  Discharge Summary      Patient Name: Alex Lovell  MRN: [id number]  Patient Class: IP- Inpatient  Admission Date: 6/25/2023  Hospital Length of Stay: 1 days  Discharge Date and Time:  06/29/2023 8:10 AM  Attending Physician: Kevyn Ferrari MD   Discharging Provider: Kevyn Mathis MD  Primary Care Provider: Primary Doctor No      HPI:   Alex Lovell is a 72 y.o. female with PMH of DMT2, HTN, hypothyroidism, lung cancer, anemia, and HLD. She presented to the emergency department today with a complaint of bilateral flank pain. Left worse than right. She reports some pain with urination, foul odor of urine, and urgency.  She denies any CP, SOB, hematuria, abd pain, N/V/D, fevers, chills, or diaphoresis. Pain is a cramping pain, worse with movement. She reports that she flew into [place] from [place] for a family reunion and started feeling bad a couple of days ago. She states the pain just continued to get worse so she decided to come in for evaluation. UA positive for UTI. Patient also noted to have H/H 8.5/29.2 on initial labs, which is lower than her baseline. Patient reports that she did notice that her stools have been darker than usual. She also admitted to experiencing dizziness, lightheadedness, and fatigue. GI contacted by ED physician and have recommended protonix BID and NPO after MN for possible intervention.       Procedure(s) (LRB):  EGD (ESOPHAGOGASTRODUODENOSCOPY) (N/A)      Hospital Course:   No notes on file     Goals of Care Treatment Preferences:  Code Status: Full Code      Consults:   Consults (From admission, onward)        Status Ordering Provider     Inpatient virtual consult to Hospital Medicine  Once        Provider:  (Not yet assigned)    Completed SUSY ENG     IP consult to case management  Once        Provider:  (Not yet assigned)    Completed DALI DESIR     Inpatient consult to Gastroenterology  Once        Provider:  (Not  yet assigned)    Completed JESE ALVA          Psychiatric  Depression  Patient has persistent depression which is unknown and is currently controlled. Will Continue anti-depressant medications. We will not consult psychiatry at this time. Patient does not display psychosis at this time. Continue to monitor closely and adjust plan of care as needed.        Cardiac/Vascular  Hyperlipidemia  · Continue statin      Hypertension  · Home BP meds o/h given possible GIB  · Will resume when cleared by GI    - 6/26   - we will resume home BP meds      ID  * Pyelonephritis  Temp Readings from Last 3 Encounters:   06/29/23 97.2 °F (36.2 °C) (Oral)     Lab Results   Component Value Date    WBC 9.19 06/28/2023     No results for input(s): LACTATE in the last 72 hours.    Associated with b/l flank pain L>R, malodorous urine, and urgency  UA positive for 3+ leukocytes, many bacteria, >100 WBC, and many WBC clumps  Continue rocephin  Discharge with Bactrim to complete abx course     Antibiotics (From admission, onward)    Start     Stop Route Frequency Ordered    06/26/23 1900  cefTRIAXone (ROCEPHIN) 1 g in dextrose 5 % in water (D5W) 5 % 100 mL IVPB (MB+)         -- IV Every 24 hours (non-standard times) 06/25/23 2327        Cultures were taken-   Microbiology Results (last 7 days)     Procedure Component Value Units Date/Time    Blood culture (site 1) [[id number]] Collected: 06/25/23 2209    Order Status: Completed Specimen: Blood from Peripheral, Right Arm Updated: 06/29/23 0612     Blood Culture, Routine No Growth to date      No Growth to date      No Growth to date      No Growth to date    Narrative:      Site # 1, aerobic and anaerobic    Blood culture (site 2) [[id number]] Collected: 06/25/23 2209    Order Status: Completed Specimen: Blood from Antecubital, Left Arm Updated: 06/29/23 0612     Blood Culture, Routine No Growth to date      No Growth to date      No Growth to date      No Growth to date    Narrative:       Site # 2, aerobic only    Urine culture [[id number]]  (Abnormal)  (Susceptibility) Collected: 06/25/23 1803    Order Status: Completed Specimen: Urine Updated: 06/28/23 1516     Urine Culture, Routine SALMONELLA SPECIES  > 100,000 cfu/ml  No other significant isolate      Narrative:      Specimen Source->Urine          Oncology  Anemia  · H/H 8.5/29.2  · Baseline H/H 1/2022 12.1/39.2  · Suspected 2/2 GIB in the setting of new onset dark stools  · Occult stool pending  · GI consulted  · Recommendation to give protonix 40 mg BID and keep NPO after MN for evaluation  · Maintain telemetry  · Transfuse for Hgb <7  · Continue PO iron      6/26  - hemoglobin stable 8.2  - GI consult, EGD today, recommendations     - large hiatal hernia, with large amount of food in the stomach     - single angioectasia in the duodenum treated with APC     - advance diet as tolerated     - continue present medications     - Protonix twice a day for 2 months     -  hold Eliquis for another 3 days then okay to resume     - repeat UGI in 2 months to assess rest of, recommend liquids 24 hours prior to procedure               Endocrine  Hypothyroidism, unspecified  · Continue synthroid      DM (diabetes mellitus)  Patient's FSGs are controlled on current medication regimen.  Last A1c reviewed-   Lab Results   Component Value Date    HGBA1C 7.7 (H) 06/27/2023     Most recent glucose reviewed- 195  Current correctional scale  Medium  Maintain anti-hyperglycemic dose as follows-   Antihyperglycemics (From admission, onward)    Start     Stop Route Frequency Ordered    06/26/23 0027  insulin aspart U-100 pen 1-10 Units         -- SubQ Every 6 hours PRN 06/25/23 2327        Hold Oral hypoglycemics while patient is in the hospital.          GI  Melena  GI following  - patient is on iron and Eliquis at home  - will check FOBT- not collected       Gastrointestinal hemorrhage associated with angiodysplasia of stomach and duodenum  Lab Results    Component Value Date    HGB 7.7 (L) 06/28/2023    HCT 26.2 (L) 06/28/2023     GI consulted  S/p EGD: A single angioectasia in the duodenum. Treated with argon plasma coagulation (APC).  protonix twice a day for 2 months      Final Active Diagnoses:    Diagnosis Date Noted POA    PRINCIPAL PROBLEM:  Pyelonephritis [N12] 06/25/2023 Yes    Gastrointestinal hemorrhage associated with angiodysplasia of stomach and duodenum [K31.811] 06/28/2023 Yes    Anemia [D64.9] 06/25/2023 Yes    DM (diabetes mellitus) [E11.9] 06/25/2023 Yes    Depression [F32.A] 06/25/2023 Yes    Hypertension [I10] 06/25/2023 Yes    Hyperlipidemia [E78.5] 06/25/2023 Yes    Hypothyroidism, unspecified [E03.9] 06/25/2023 Yes    Melena [K92.1] 06/25/2023 Yes      Problems Resolved During this Admission:       Discharged Condition: good    Disposition: Home or Self Care    Follow Up:   Follow-up Information     Follow up with a Primary Care MD. Call today.    Why: For your return home to [place], please visit this website to find and schedule a provider covered with your plan. 1-800-MEDICARE ([phone])  Contact information:  [email or web address]                     Patient Instructions:      Ambulatory referral[facility]ult to [facility]   Standing Status: Future   Referral Priority: Routine Referral Type: Consultation   Referral Reason: Specialty Services Required   Number of Visits Requested: 1     Ambulatory referral/consult to Gastroenterology   Standing Status: Future   Referral Priority: Urgent Referral Type: Consultation   Referral Reason: Specialty Services Required   Requested Specialty: Gastroenterology   Number of Visits Requested: 1     Diet Adult Regular     Notify your health care provider if you experience any of the following:  temperature >100.4     Activity as tolerated       Significant Diagnostic Studies: N/A    Pending Diagnostic  Studies:     None         Medications:  Reconciled Home Medications:      Medication List      START taking these medications    pantoprazole 40 MG tablet  Commonly known as: PROTONIX  Take 1 tablet (40 mg total) by mouth 2 (two) times daily.        CHANGE how you take these medications    ELIQUIS 5 mg Tab  Generic drug: apixaban  Take 1 tablet (5 mg total) by mouth 2 (two) times daily. HOLD UNTIL 6/29/23  What changed: additional instructions        CONTINUE taking these medications    amLODIPine 5 MG tablet  Commonly known as: NORVASC  Take 5 mg by mouth.     atorvastatin 40 MG tablet  Commonly known as: LIPITOR  Take 40 mg by mouth.     carvediloL 3.125 MG tablet  Commonly known as: COREG  Take 3.125 mg by mouth 2 (two) times daily.     DULoxetine 30 MG capsule  Commonly known as: CYMBALTA  Take 30 mg by mouth 2 (two) times daily.     ferrous sulfate 325 mg (65 mg iron) Tab tablet  Commonly known as: FEOSOL  Take 325 mg by mouth 2 (two) times daily.     gabapentin 300 MG capsule  Commonly known as: NEURONTIN  Take by mouth.     JARDIANCE 25 mg tablet  Generic drug: empagliflozin  Take 25 mg by mouth.     levothyroxine 112 MCG tablet  Commonly known as: SYNTHROID  Take 112 mcg by mouth.     losartan 50 MG tablet  Commonly known as: COZAAR  Take 50 mg by mouth.     metFORMIN 500 MG tablet  Commonly known as: GLUCOPHAGE  Take 500 mg by mouth 2 (two) times daily.     sulfamethoxazole-trimethoprim 800-160mg 800-160 mg Tab  Commonly known as: BACTRIM DS  Take 1 tablet by mouth 2 (two) times daily. for 3 days     SYMBICORT 160-4.5 mcg/actuation Hfaa  Generic drug: budesonide-formoterol 160-4.5 mcg  Inhale into the lungs.     VENTOLIN HFA 90 mcg/actuation inhaler  Generic drug: albuterol  Inhale into the lungs.     VICTOZA 3-ANTONIO 0.6 mg/0.1 mL (18 mg/3 mL) Pnij pen  Generic drug: liraglutide 0.6 mg/0.1 mL (18 mg/3 mL) subq PNIJ  Inject into the skin.        STOP taking these medications    omeprazole 20 MG  capsule  Commonly known as: PRILOSEC            Indwelling Lines/Drains at time of discharge:   Lines/Drains/Airways     Drain  Duration           Female External Urinary Catheter 06/25/23 2300 3 days                Time spent on the discharge of patient: 30 minutes         The attending portion of this evaluation, treatment, and documentation was performed per Kevyn Mathis MD via Telemedicine AudioVisual using the secure [employer] software platform with 2 way audio/video. The provider was located off-site and the patient is located in the hospital. The aforementioned video software was utilized to document the relevant history and physical exam    Kevyn Mathis MD  Department of [facility] Medicine  [facility]

## 2023-06-29 NOTE — ASSESSMENT & PLAN NOTE
Temp Readings from Last 3 Encounters:   06/29/23 97.2 °F (36.2 °C) (Oral)     Lab Results   Component Value Date    WBC 9.19 06/28/2023     No results for input(s): LACTATE in the last 72 hours.    Associated with b/l flank pain L>R, malodorous urine, and urgency  UA positive for 3+ leukocytes, many bacteria, >100 WBC, and many WBC clumps  Continue rocephin  Discharge with Bactrim to complete abx course     Antibiotics (From admission, onward)    Start     Stop Route Frequency Ordered    06/26/23 1900  cefTRIAXone (ROCEPHIN) 1 g in dextrose 5 % in water (D5W) 5 % 100 mL IVPB (MB+)         -- IV Every 24 hours (non-standard times) 06/25/23 2327        Cultures were taken-   Microbiology Results (last 7 days)     Procedure Component Value Units Date/Time    Blood culture (site 1) [[id number]] Collected: 06/25/23 2209    Order Status: Completed Specimen: Blood from Peripheral, Right Arm Updated: 06/29/23 0612     Blood Culture, Routine No Growth to date      No Growth to date      No Growth to date      No Growth to date    Narrative:      Site # 1, aerobic and anaerobic    Blood culture (site 2) [[id number]] Collected: 06/25/23 2209    Order Status: Completed Specimen: Blood from Antecubital, Left Arm Updated: 06/29/23 0612     Blood Culture, Routine No Growth to date      No Growth to date      No Growth to date      No Growth to date    Narrative:      Site # 2, aerobic only    Urine culture [[id number]]  (Abnormal)  (Susceptibility) Collected: 06/25/23 1803    Order Status: Completed Specimen: Urine Updated: 06/28/23 1516     Urine Culture, Routine SALMONELLA SPECIES  > 100,000 cfu/ml  No other significant isolate      Narrative:      Specimen Source->Urine

## 2023-06-30 NOTE — ADDENDUM NOTE
Addendum  created 06/30/23 1440 by Alexandra Manzano CRNA    Intraprocedure Event edited, Intraprocedure Staff edited

## 2023-07-01 LAB
BACTERIA BLD CULT: NORMAL
BACTERIA BLD CULT: NORMAL

## 2023-10-02 PROBLEM — K31.811 GASTROINTESTINAL HEMORRHAGE ASSOCIATED WITH ANGIODYSPLASIA OF STOMACH AND DUODENUM: Status: RESOLVED | Noted: 2023-06-28 | Resolved: 2023-10-02

## 2023-10-02 PROBLEM — N12 PYELONEPHRITIS: Status: RESOLVED | Noted: 2023-06-25 | Resolved: 2023-10-02

## 2024-07-08 NOTE — PROGRESS NOTES
Order #[id number] signed by Dr. Lopez and faxed back to [facility] with confirmation and order sent for scanning.     [facility] Medicine  Progress Note    Patient Name: Alex Lovell  MRN: [id number]  Patient Class: OP- Observation   Admission Date: 6/25/2023  Length of Stay: 0 days  Attending Physician: Garland Garibay, *  Primary Care Provider: Primary Doctor No        Subjective:     Principal Problem:Pyelonephritis        HPI:  Alex Lovell is a 72 y.o. female with PMH of DMT2, HTN, hypothyroidism, lung cancer, anemia, and HLD. She presented to the emergency department today with a complaint of bilateral flank pain. Left worse than right. She reports some pain with urination, foul odor of urine, and urgency.  She denies any CP, SOB, hematuria, abd pain, N/V/D, fevers, chills, or diaphoresis. Pain is a cramping pain, worse with movement. She reports that she flew into [place] from [place] for a family reunion and started feeling bad a couple of days ago. She states the pain just continued to get worse so she decided to come in for evaluation. UA positive for UTI. Patient also noted to have H/H 8.5/29.2 on initial labs, which is lower than her baseline. Patient reports that she did notice that her stools have been darker than usual. She also admitted to experiencing dizziness, lightheadedness, and fatigue. GI contacted by ED physician and have recommended protonix BID and NPO after MN for possible intervention.       Overview/Hospital Course:  No notes on file    Interval History:  patient complains of LUQ cramp. She otherwise denies complaints.        Objective:     Vital Signs (Most Recent):  Temp: 98.4 °F (36.9 °C) (06/27/23 0748)  Pulse: 85 (06/27/23 0809)  Resp: 18 (06/27/23 0809)  BP: (!) 142/81 (06/27/23 0748)  SpO2: (!) 93 % (06/27/23 0809) Vital Signs (24h Range):  Temp:  [96 °F (35.6 °C)-98.6 °F (37 °C)] 98.4 °F (36.9 °C)  Pulse:  [] 85  Resp:  [16-22] 18  SpO2:  [87 %-100 %] 93 %  BP: (127-187)/() 142/81     Weight: 99.9 kg (220 lb 3.8 oz)  Body mass index is 40.28  kg/m².    Intake/Output Summary (Last 24 hours) at 6/27/2023 1057  Last data filed at 6/27/2023 0455  Gross per 24 hour   Intake 250 ml   Output 1000 ml   Net -750 ml           Physical Exam  Constitutional:       Appearance: Normal appearance. She is obese. She is not ill-appearing.   HENT:      Head: Normocephalic and atraumatic.      Mouth/Throat:      Mouth: Mucous membranes are moist.   Eyes:      Extraocular Movements: Extraocular movements intact.   Cardiovascular:      Rate and Rhythm: Normal rate and regular rhythm.      Pulses: Normal pulses.      Heart sounds: Normal heart sounds.   Pulmonary:      Effort: No respiratory distress.      Breath sounds: Normal breath sounds.   Abdominal:      General: Abdomen is flat. Bowel sounds are normal.      Palpations: Abdomen is soft.      Tenderness: There is abdominal tenderness (LUQ).   Genitourinary:     Comments: Pt voids   Musculoskeletal:         General: Normal range of motion.      Cervical back: Normal range of motion.   Skin:     General: Skin is warm and dry.   Neurological:      Mental Status: She is alert and oriented to person, place, and time.   Psychiatric:         Mood and Affect: Mood normal.           Significant Labs: All pertinent labs within the past 24 hours have been reviewed.      Significant Imaging: I have reviewed all pertinent imaging results/findings within the past 24 hours.      Assessment/Plan:      * Pyelonephritis  · Associated with b/l flank pain L>R, malodorous urine, and urgency  · UA positive for 3+ leukocytes, many bacteria, >100 WBC, and many WBC clumps  · Continue rocephin  · Urine cx in process  · Blood cx in process  · PRN analgesics for flank pain    6/26  - blood culture 2/2 ngtd  - urine culture pending  - continue ceftriaxone    6/27  Awaiting urine cx results to deescalate abx before discharge   Encouraged OOB to assist in the passing of flatus and gas pains     Hypothyroidism, unspecified  · Continue  synthroid      Hyperlipidemia  · Continue statin      Hypertension  · Home BP meds o/h given possible GIB  · Will resume when cleared by GI    - 6/26   - we will resume home BP meds      Depression  Patient has persistent depression which is unknown and is currently controlled. Will Continue anti-depressant medications. We will not consult psychiatry at this time. Patient does not display psychosis at this time. Continue to monitor closely and adjust plan of care as needed.        DM (diabetes mellitus)  Patient's FSGs are controlled on current medication regimen.  Last A1c reviewed-   Lab Results   Component Value Date    HGBA1C 7.7 (H) 06/27/2023     Most recent glucose reviewed- 195  Current correctional scale  Medium  Maintain anti-hyperglycemic dose as follows-   Antihyperglycemics (From admission, onward)    Start     Stop Route Frequency Ordered    06/26/23 0027  insulin aspart U-100 pen 1-10 Units         -- SubQ Every 6 hours PRN 06/25/23 2327        Hold Oral hypoglycemics while patient is in the hospital.          Melena  GI following  - patient is on iron and Eliquis at home  - will check FOBT- not collected       Anemia  · H/H 8.5/29.2  · Baseline H/H 1/2022 12.1/39.2  · Suspected 2/2 GIB in the setting of new onset dark stools  · Occult stool pending  · GI consulted  · Recommendation to give protonix 40 mg BID and keep NPO after MN for evaluation  · Maintain telemetry  · Transfuse for Hgb <7  · Continue PO iron      6/26  - hemoglobin stable 8.2  - GI consult, EGD today, recommendations     - large hiatal hernia, with large amount of food in the stomach     - single angioectasia in the duodenum treated with APC     - advance diet as tolerated     - continue present medications     - Protonix twice a day for 2 months     -  hold Eliquis for another 3 days then okay to resume     - repeat UGI in 2 months to assess rest of, recommend liquids 24 hours prior to procedure                 VTE Risk Mitigation  (From admission, onward)         Ordered     IP VTE HIGH RISK PATIENT  Once         06/25/23 2155     Place sequential compression device  Until discontinued         06/25/23 2155     Reason for No Pharmacological VTE Prophylaxis  Once        Question:  Reasons:  Answer:  Active Bleeding    06/25/23 2155                Discharge Planning   KLAUDIA: 6/27/2023     Code Status: Full Code   Is the patient medically ready for discharge?:     Reason for patient still in hospital (select all that apply): Patient trending condition  Discharge Plan A: Home with family   Discharge Delays: None known at this time              Agata Zimmerman NP  Department of [facility] Medicine   [facility]